# Patient Record
Sex: MALE | Race: WHITE | Employment: FULL TIME | URBAN - METROPOLITAN AREA
[De-identification: names, ages, dates, MRNs, and addresses within clinical notes are randomized per-mention and may not be internally consistent; named-entity substitution may affect disease eponyms.]

---

## 2019-08-14 ENCOUNTER — APPOINTMENT (OUTPATIENT)
Dept: GENERAL RADIOLOGY | Age: 49
End: 2019-08-14
Payer: COMMERCIAL

## 2019-08-14 ENCOUNTER — HOSPITAL ENCOUNTER (EMERGENCY)
Age: 49
Discharge: HOME | End: 2019-08-14
Attending: EMERGENCY MEDICINE
Payer: COMMERCIAL

## 2019-08-14 VITALS
BODY MASS INDEX: 19.6 KG/M2 | HEIGHT: 71 IN | HEART RATE: 81 BPM | WEIGHT: 140 LBS | TEMPERATURE: 98.1 F | OXYGEN SATURATION: 99 % | RESPIRATION RATE: 16 BRPM | SYSTOLIC BLOOD PRESSURE: 138 MMHG | DIASTOLIC BLOOD PRESSURE: 79 MMHG

## 2019-08-14 DIAGNOSIS — S60.221A CONTUSION OF RIGHT HAND, INITIAL ENCOUNTER: Primary | ICD-10-CM

## 2019-08-14 PROCEDURE — 99283 EMERGENCY DEPT VISIT LOW MDM: CPT

## 2019-08-14 PROCEDURE — 73140 X-RAY EXAM OF FINGER(S): CPT

## 2020-04-27 ENCOUNTER — APPOINTMENT (OUTPATIENT)
Dept: GENERAL RADIOLOGY | Age: 50
End: 2020-04-27
Payer: COMMERCIAL

## 2020-04-27 ENCOUNTER — HOSPITAL ENCOUNTER (EMERGENCY)
Age: 50
Discharge: HOME | End: 2020-04-27
Payer: COMMERCIAL

## 2020-04-27 VITALS
OXYGEN SATURATION: 100 % | RESPIRATION RATE: 18 BRPM | TEMPERATURE: 98.1 F | HEIGHT: 72 IN | HEART RATE: 102 BPM | SYSTOLIC BLOOD PRESSURE: 125 MMHG | WEIGHT: 140 LBS | DIASTOLIC BLOOD PRESSURE: 75 MMHG | BODY MASS INDEX: 18.96 KG/M2

## 2020-04-27 PROCEDURE — 6370000000 HC RX 637 (ALT 250 FOR IP): Performed by: EMERGENCY MEDICINE

## 2020-04-27 PROCEDURE — 99283 EMERGENCY DEPT VISIT LOW MDM: CPT

## 2020-04-27 PROCEDURE — 6360000002 HC RX W HCPCS: Performed by: EMERGENCY MEDICINE

## 2020-04-27 PROCEDURE — 90715 TDAP VACCINE 7 YRS/> IM: CPT | Performed by: EMERGENCY MEDICINE

## 2020-04-27 PROCEDURE — 73630 X-RAY EXAM OF FOOT: CPT

## 2020-04-27 PROCEDURE — 90471 IMMUNIZATION ADMIN: CPT | Performed by: EMERGENCY MEDICINE

## 2020-04-27 RX ORDER — CEPHALEXIN 500 MG/1
500 CAPSULE ORAL ONCE
Status: COMPLETED | OUTPATIENT
Start: 2020-04-27 | End: 2020-04-27

## 2020-04-27 RX ORDER — HYDROCODONE BITARTRATE AND ACETAMINOPHEN 5; 325 MG/1; MG/1
1 TABLET ORAL EVERY 6 HOURS PRN
Qty: 12 TABLET | Refills: 0 | Status: SHIPPED | OUTPATIENT
Start: 2020-04-27 | End: 2020-04-30

## 2020-04-27 RX ORDER — HYDROCODONE BITARTRATE AND ACETAMINOPHEN 5; 325 MG/1; MG/1
1 TABLET ORAL ONCE
Status: COMPLETED | OUTPATIENT
Start: 2020-04-27 | End: 2020-04-27

## 2020-04-27 RX ORDER — CEPHALEXIN 500 MG/1
500 CAPSULE ORAL 4 TIMES DAILY
Qty: 40 CAPSULE | Refills: 0 | Status: SHIPPED | OUTPATIENT
Start: 2020-04-27 | End: 2020-07-10

## 2020-04-27 RX ADMIN — TETANUS TOXOID, REDUCED DIPHTHERIA TOXOID AND ACELLULAR PERTUSSIS VACCINE, ADSORBED 0.5 ML: 5; 2.5; 8; 8; 2.5 SUSPENSION INTRAMUSCULAR at 10:48

## 2020-04-27 RX ADMIN — CEPHALEXIN 500 MG: 500 CAPSULE ORAL at 10:48

## 2020-04-27 RX ADMIN — HYDROCODONE BITARTRATE AND ACETAMINOPHEN 1 TABLET: 5; 325 TABLET ORAL at 10:48

## 2020-04-27 ASSESSMENT — PAIN DESCRIPTION - FREQUENCY: FREQUENCY: INTERMITTENT

## 2020-04-27 ASSESSMENT — ENCOUNTER SYMPTOMS: COUGH: 0

## 2020-04-27 ASSESSMENT — PAIN DESCRIPTION - PAIN TYPE: TYPE: ACUTE PAIN

## 2020-04-27 ASSESSMENT — PAIN SCALES - GENERAL
PAINLEVEL_OUTOF10: 10
PAINLEVEL_OUTOF10: 10

## 2020-04-27 ASSESSMENT — PAIN DESCRIPTION - ORIENTATION: ORIENTATION: RIGHT

## 2020-04-27 ASSESSMENT — PAIN DESCRIPTION - ONSET: ONSET: SUDDEN

## 2020-04-27 ASSESSMENT — PAIN DESCRIPTION - LOCATION: LOCATION: FOOT

## 2020-04-27 NOTE — ED PROVIDER NOTES
[facility] [facility] Name: Joseph Lantigua  MRN: [id number]  Marygfabimael 1970  Date of evaluation: 4/27/2020  Provider:  No att. providers found                  HISTORY OF PRESENT ILLNESS   (Location/Symptom, Timing/Onset, Context/Setting, Quality, Duration, Modifying Factors, Severity)  Note limiting factors. Patient presents emergency department with complaint of injury to his right foot. Patient states a glass top from a desk fell on his foot the glass top remained intact. But he has had significant pain in his foot since that time. Patient has a puncture wound to the foot. He is not sure of his tetanus status. He states he has had no fevers chills no cough no cold no nausea no vomiting no other problems. He is allergic to morphine and peanut butter but can eat other peanut products just not peanut butter. The history is provided by the patient. Nursing Notes were reviewed. REVIEW OF SYSTEMS    (2-9 systems for level 4, 10 or more for level 5)     Review of Systems   Constitutional: Negative for chills and fever. Respiratory: Negative for cough. Musculoskeletal: Positive for arthralgias and myalgias. Skin: Positive for wound. PAST MEDICAL HISTORY   has a past medical history of Asthma. PAST SURGICAL HISTORY   has a past surgical history that includes shoulder surgery. FAMILY HISTORY  family history is not on file. SOCIAL HISTORY   reports that he quit smoking about 6 years ago. His smoking use included cigarettes. He smoked 2.00 packs per day. He has never used smokeless tobacco. He reports current alcohol use. He reports that he does not use drugs. HOME MEDICATIONS     Prior to Admission medications    Not on File        ALLERGIES  is allergic to peanut butter flavor and morphine.             PHYSICAL EXAM    (up to 7 for level 4, 8 or more for level 5)         ED TRIAGE VITALS      /75   Pulse 102   Temp 98.1 °F (36.7 °C) (Oral)   Resp 18   Ht 5' 11.75\" (1.822 m)   Wt 140 lb (63.5 kg)   SpO2 100%   BMI 19.12 kg/m²         Physical Exam  Constitutional:       Appearance: He is well-developed. He is not ill-appearing or toxic-appearing. HENT:      Head: Normocephalic and atraumatic. Pulmonary:      Effort: Pulmonary effort is normal.   Musculoskeletal:        Feet:    Skin:     General: Skin is warm and dry. Neurological:      Mental Status: He is alert and oriented to person, place, and time. Motor: No abnormal muscle tone. Psychiatric:         Behavior: Behavior normal.     No tenderness to the ankle. Full range of motion of the knee and hip. DIAGNOSTIC RESULTS         RADIOLOGY:     XR FOOT RIGHT (MIN 3 VIEWS)   Final Result   Acute fractures of the 2nd and 3rd proximal phalanges. LABS:              EKG interpreted by         PROCEDURES:  Procedures        Emergency Department Course:      10:05 AM EDT  I spoke with Dr. Zara Amezcua. We thoroughly discussed the history, physical exam, laboratory and imaging studies, as well as, emergency department course. Based upon that discussion, we've decided to discharge Stephan Cogan home. We've agreed upon prompt follow in their office for a re-assessment. This time patient will be placed on antibiotics he will also be placed in a boot and crutches as he has difficulty walking. Dr. Zara Amezcua states that they will be happy to see him. Will refer him to Dr. Sowmya Bond but the patient can call the office and they will arrange follow-up that is closest and convenient for him they would like to see him within the next 2 days. 10:19 AM EDT: I spoke with the patient and discussed my conversation with Dr. Zara Amezcua. He is agreeable to the plan. We will thoroughly cleansed his wounds placed on Vicodin which she has had in the past without problems start him on an antibiotic and he will follow-up with Dr. James Perla.   Discussed results, diagnosis and

## 2020-04-29 ENCOUNTER — ANESTHESIA EVENT (OUTPATIENT)
Dept: OPERATING ROOM | Age: 50
End: 2020-04-29
Payer: COMMERCIAL

## 2020-04-29 ENCOUNTER — OFFICE VISIT (OUTPATIENT)
Dept: ORTHOPEDIC SURGERY | Age: 50
End: 2020-04-29
Payer: COMMERCIAL

## 2020-04-29 ENCOUNTER — HOSPITAL ENCOUNTER (OUTPATIENT)
Age: 50
Discharge: HOME | End: 2020-04-29
Payer: COMMERCIAL

## 2020-04-29 VITALS — BODY MASS INDEX: 18.96 KG/M2 | HEIGHT: 72 IN | WEIGHT: 139.99 LBS

## 2020-04-29 LAB
ANION GAP SERPL CALCULATED.3IONS-SCNC: 12 MMOL/L (ref 3–16)
BASOPHILS ABSOLUTE: 0.1 K/UL (ref 0–0.2)
BASOPHILS RELATIVE PERCENT: 0.8 %
BUN BLDV-MCNC: 9 MG/DL (ref 7–20)
CALCIUM SERPL-MCNC: 9.7 MG/DL (ref 8.3–10.6)
CHLORIDE BLD-SCNC: 100 MMOL/L (ref 99–110)
CO2: 26 MMOL/L (ref 21–32)
CREAT SERPL-MCNC: 0.7 MG/DL (ref 0.9–1.3)
EOSINOPHILS ABSOLUTE: 0.1 K/UL (ref 0–0.6)
EOSINOPHILS RELATIVE PERCENT: 1.7 %
GFR AFRICAN AMERICAN: >60
GFR NON-AFRICAN AMERICAN: >60
GLUCOSE BLD-MCNC: 118 MG/DL (ref 70–99)
HCT VFR BLD CALC: 44.9 % (ref 40.5–52.5)
HEMOGLOBIN: 15.1 G/DL (ref 13.5–17.5)
LYMPHOCYTES ABSOLUTE: 1.1 K/UL (ref 1–5.1)
LYMPHOCYTES RELATIVE PERCENT: 14.7 %
MCH RBC QN AUTO: 30.1 PG (ref 26–34)
MCHC RBC AUTO-ENTMCNC: 33.5 G/DL (ref 31–36)
MCV RBC AUTO: 89.9 FL (ref 80–100)
MONOCYTES ABSOLUTE: 0.4 K/UL (ref 0–1.3)
MONOCYTES RELATIVE PERCENT: 6 %
NEUTROPHILS ABSOLUTE: 5.8 K/UL (ref 1.7–7.7)
NEUTROPHILS RELATIVE PERCENT: 76.8 %
PDW BLD-RTO: 13.9 % (ref 12.4–15.4)
PLATELET # BLD: 224 K/UL (ref 135–450)
PMV BLD AUTO: 8.5 FL (ref 5–10.5)
POTASSIUM SERPL-SCNC: 4.1 MMOL/L (ref 3.5–5.1)
RBC # BLD: 4.99 M/UL (ref 4.2–5.9)
SODIUM BLD-SCNC: 138 MMOL/L (ref 136–145)
WBC # BLD: 7.5 K/UL (ref 4–11)

## 2020-04-29 PROCEDURE — 36415 COLL VENOUS BLD VENIPUNCTURE: CPT

## 2020-04-29 PROCEDURE — 85025 COMPLETE CBC W/AUTO DIFF WBC: CPT

## 2020-04-29 PROCEDURE — 80048 BASIC METABOLIC PNL TOTAL CA: CPT

## 2020-04-29 PROCEDURE — 99203 OFFICE O/P NEW LOW 30 MIN: CPT | Performed by: ORTHOPAEDIC SURGERY

## 2020-04-29 NOTE — PROGRESS NOTES
Chief Complaint    New Patient ([facility] Right Foot: DOI 4/27/2020 was picking up a heavy glass and slipped out of his hand and came down on his foot, most of the pain on the and front half of the foot, some tingling, icing increases pain, unable to WTB due to pain, seen in ER 4/27/2020 placed in boot closed FX of the 3 toe )      History of Present Illness:  Patricia Maya is a 52 y.o. malehere for evaluation chief complaint of right foot pain. He states on 4/27/2020 he was at work as a  and he was trying to put a glass table into his truck when it slipped and landed on his right foot. He had a work boot on at the time and it cut through the work boot. This was an isolated injury to his right foot. Denies any other injuries. He was seen in the emergency room and placed into a low tide boot. He was placed on antibiotics but did not get them filled. He is taking ibuprofen for his pain. He has had multiple orthopedic injuries in the past.  Rates his pain at 8 out of 10. Cold seems to make it worse elevation and ibuprofen make it better      Medical History:  Patient's medications, allergies, past medical, surgical, social and family histories were reviewed and updated as appropriate. Review of Systems:  Pertinent items are noted in HPI  Review of systems reviewed from Patient History Form dated on 4/29/2020 and available in the patient's chart under the Media tab. Vital Signs:  Ht 5' 11.73\" (1.822 m)   Wt 139 lb 15.9 oz (63.5 kg)   BMI 19.13 kg/m²     General Exam:   Constitutional: Patient is adequately groomed with no evidence of malnutrition  DTRs: Deep tendon reflexes are intact  Mental Status: The patient is oriented to time, place and person. The patient's mood and affect are appropriate. Lymphatic: The lymphatic examination bilaterally reveals all areas to be without enlargement or induration. Foot Examination:    Inspection: Moderate swelling right foot and ankle.   He has a small abrasion over the dorsal aspect of the right forefoot at the base of the second and third toes    Palpation: Tenderness throughout the right foot    Range of Motion: Limited at the toes due to complaint of pain    Strength: Flexor and extensor tendons are intact to the right forefoot    Special Tests: No evidence of DVT or compartment syndrome    Skin: There are no rashes, ulcerations or lesions. Gait: Antalgic    Reflex 2+ and symmetric    Additional Comments:       Additional Examinations:         Left Lower Extremity: Examination of the left lower extremity does not show any tenderness, deformity or injury. Range of motion is unremarkable. There is no gross instability. There are no rashes, ulcerations or lesions. Strength and tone are normal.    Radiology:     X-rays obtained and reviewed in office:  Views 3  Location right foot  Impression obtained previously shows evidence of a second toe P1 fracture that is in a valgus position of approximately 30 to 40 degrees. He has a comminuted P1 fracture of the third toe which is 100% displaced          Assessment : This patient has crush injury to his right foot with second and third toe P1 fractures that are displaced and the third is extremely comminuted    Impression:  No diagnosis found. Office Procedures:  No orders of the defined types were placed in this encounter. Treatment Plan:  The etiology of comminuted toe fracture and it's appropriate treatment were discussed in great detail. We discussed operative and nonoperative options. I would recommend closed versus possible open reduction and pinning of the second and third toes. We discussed postop rehab and estimated return to work. 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 He will not be able to do this until released 3 months out from his date of surgery which is 4/30/2020.   All questions were answered no guarantees were given

## 2020-04-30 ENCOUNTER — HOSPITAL ENCOUNTER (OUTPATIENT)
Age: 50
Setting detail: OUTPATIENT SURGERY
Discharge: HOME | End: 2020-04-30
Attending: ORTHOPAEDIC SURGERY | Admitting: ORTHOPAEDIC SURGERY
Payer: COMMERCIAL

## 2020-04-30 ENCOUNTER — ANESTHESIA (OUTPATIENT)
Dept: OPERATING ROOM | Age: 50
End: 2020-04-30
Payer: COMMERCIAL

## 2020-04-30 VITALS
SYSTOLIC BLOOD PRESSURE: 126 MMHG | HEIGHT: 71 IN | RESPIRATION RATE: 12 BRPM | BODY MASS INDEX: 19.6 KG/M2 | TEMPERATURE: 96.2 F | DIASTOLIC BLOOD PRESSURE: 94 MMHG | HEART RATE: 86 BPM | WEIGHT: 140 LBS | OXYGEN SATURATION: 98 %

## 2020-04-30 VITALS
OXYGEN SATURATION: 100 % | TEMPERATURE: 98.6 F | RESPIRATION RATE: 18 BRPM | SYSTOLIC BLOOD PRESSURE: 115 MMHG | DIASTOLIC BLOOD PRESSURE: 72 MMHG

## 2020-04-30 PROCEDURE — 7100000000 HC PACU RECOVERY - FIRST 15 MIN: Performed by: ORTHOPAEDIC SURGERY

## 2020-04-30 PROCEDURE — 2580000003 HC RX 258: Performed by: ORTHOPAEDIC SURGERY

## 2020-04-30 PROCEDURE — 2500000003 HC RX 250 WO HCPCS

## 2020-04-30 PROCEDURE — 2500000003 HC RX 250 WO HCPCS: Performed by: ORTHOPAEDIC SURGERY

## 2020-04-30 PROCEDURE — 3600000004 HC SURGERY LEVEL 4 BASE: Performed by: ORTHOPAEDIC SURGERY

## 2020-04-30 PROCEDURE — 6360000002 HC RX W HCPCS: Performed by: ANESTHESIOLOGY

## 2020-04-30 PROCEDURE — 7100000011 HC PHASE II RECOVERY - ADDTL 15 MIN: Performed by: ORTHOPAEDIC SURGERY

## 2020-04-30 PROCEDURE — 3600000014 HC SURGERY LEVEL 4 ADDTL 15MIN: Performed by: ORTHOPAEDIC SURGERY

## 2020-04-30 PROCEDURE — 6360000002 HC RX W HCPCS

## 2020-04-30 PROCEDURE — 2580000003 HC RX 258: Performed by: ANESTHESIOLOGY

## 2020-04-30 PROCEDURE — 3700000000 HC ANESTHESIA ATTENDED CARE: Performed by: ORTHOPAEDIC SURGERY

## 2020-04-30 PROCEDURE — 3700000001 HC ADD 15 MINUTES (ANESTHESIA): Performed by: ORTHOPAEDIC SURGERY

## 2020-04-30 PROCEDURE — 2709999900 HC NON-CHARGEABLE SUPPLY: Performed by: ORTHOPAEDIC SURGERY

## 2020-04-30 PROCEDURE — 2500000003 HC RX 250 WO HCPCS: Performed by: ANESTHESIOLOGY

## 2020-04-30 PROCEDURE — 7100000001 HC PACU RECOVERY - ADDTL 15 MIN: Performed by: ORTHOPAEDIC SURGERY

## 2020-04-30 PROCEDURE — 6370000000 HC RX 637 (ALT 250 FOR IP): Performed by: ANESTHESIOLOGY

## 2020-04-30 PROCEDURE — C1713 ANCHOR/SCREW BN/BN,TIS/BN: HCPCS | Performed by: ORTHOPAEDIC SURGERY

## 2020-04-30 PROCEDURE — 7100000010 HC PHASE II RECOVERY - FIRST 15 MIN: Performed by: ORTHOPAEDIC SURGERY

## 2020-04-30 DEVICE — K WIRE FIX L152MM DIA1.6MM S STL 2 TRCR PNT: Type: IMPLANTABLE DEVICE | Site: TOES | Status: FUNCTIONAL

## 2020-04-30 RX ORDER — PROMETHAZINE HYDROCHLORIDE 25 MG/ML
6.25 INJECTION, SOLUTION INTRAMUSCULAR; INTRAVENOUS
Status: DISCONTINUED | OUTPATIENT
Start: 2020-04-30 | End: 2020-04-30 | Stop reason: HOSPADM

## 2020-04-30 RX ORDER — FENTANYL CITRATE 50 UG/ML
INJECTION, SOLUTION INTRAMUSCULAR; INTRAVENOUS PRN
Status: DISCONTINUED | OUTPATIENT
Start: 2020-04-30 | End: 2020-04-30 | Stop reason: SDUPTHER

## 2020-04-30 RX ORDER — OXYCODONE HYDROCHLORIDE AND ACETAMINOPHEN 5; 325 MG/1; MG/1
1 TABLET ORAL PRN
Status: DISCONTINUED | OUTPATIENT
Start: 2020-04-30 | End: 2020-04-30 | Stop reason: HOSPADM

## 2020-04-30 RX ORDER — SODIUM CHLORIDE 0.9 % (FLUSH) 0.9 %
10 SYRINGE (ML) INJECTION PRN
Status: DISCONTINUED | OUTPATIENT
Start: 2020-04-30 | End: 2020-04-30 | Stop reason: HOSPADM

## 2020-04-30 RX ORDER — LABETALOL HYDROCHLORIDE 5 MG/ML
5 INJECTION, SOLUTION INTRAVENOUS EVERY 10 MIN PRN
Status: DISCONTINUED | OUTPATIENT
Start: 2020-04-30 | End: 2020-04-30 | Stop reason: HOSPADM

## 2020-04-30 RX ORDER — ACETAMINOPHEN 500 MG
1000 TABLET ORAL ONCE
Status: COMPLETED | OUTPATIENT
Start: 2020-04-30 | End: 2020-04-30

## 2020-04-30 RX ORDER — SCOLOPAMINE TRANSDERMAL SYSTEM 1 MG/1
1 PATCH, EXTENDED RELEASE TRANSDERMAL
Status: DISCONTINUED | OUTPATIENT
Start: 2020-04-30 | End: 2020-04-30 | Stop reason: HOSPADM

## 2020-04-30 RX ORDER — SODIUM CHLORIDE 0.9 % (FLUSH) 0.9 %
10 SYRINGE (ML) INJECTION EVERY 12 HOURS SCHEDULED
Status: DISCONTINUED | OUTPATIENT
Start: 2020-04-30 | End: 2020-04-30 | Stop reason: HOSPADM

## 2020-04-30 RX ORDER — SCOLOPAMINE TRANSDERMAL SYSTEM 1 MG/1
PATCH, EXTENDED RELEASE TRANSDERMAL
Status: DISCONTINUED
Start: 2020-04-30 | End: 2020-04-30 | Stop reason: HOSPADM

## 2020-04-30 RX ORDER — PROPOFOL 10 MG/ML
INJECTION, EMULSION INTRAVENOUS PRN
Status: DISCONTINUED | OUTPATIENT
Start: 2020-04-30 | End: 2020-04-30 | Stop reason: SDUPTHER

## 2020-04-30 RX ORDER — ONDANSETRON 2 MG/ML
INJECTION INTRAMUSCULAR; INTRAVENOUS PRN
Status: DISCONTINUED | OUTPATIENT
Start: 2020-04-30 | End: 2020-04-30 | Stop reason: SDUPTHER

## 2020-04-30 RX ORDER — SODIUM CHLORIDE, SODIUM LACTATE, POTASSIUM CHLORIDE, CALCIUM CHLORIDE 600; 310; 30; 20 MG/100ML; MG/100ML; MG/100ML; MG/100ML
INJECTION, SOLUTION INTRAVENOUS CONTINUOUS
Status: DISCONTINUED | OUTPATIENT
Start: 2020-04-30 | End: 2020-04-30 | Stop reason: HOSPADM

## 2020-04-30 RX ORDER — HYDRALAZINE HYDROCHLORIDE 20 MG/ML
5 INJECTION INTRAMUSCULAR; INTRAVENOUS EVERY 10 MIN PRN
Status: DISCONTINUED | OUTPATIENT
Start: 2020-04-30 | End: 2020-04-30 | Stop reason: HOSPADM

## 2020-04-30 RX ORDER — MORPHINE SULFATE 2 MG/ML
2 INJECTION, SOLUTION INTRAMUSCULAR; INTRAVENOUS EVERY 5 MIN PRN
Status: DISCONTINUED | OUTPATIENT
Start: 2020-04-30 | End: 2020-04-30 | Stop reason: HOSPADM

## 2020-04-30 RX ORDER — BUPIVACAINE HYDROCHLORIDE 5 MG/ML
INJECTION, SOLUTION EPIDURAL; INTRACAUDAL PRN
Status: DISCONTINUED | OUTPATIENT
Start: 2020-04-30 | End: 2020-04-30 | Stop reason: ALTCHOICE

## 2020-04-30 RX ORDER — MAGNESIUM HYDROXIDE 1200 MG/15ML
LIQUID ORAL CONTINUOUS PRN
Status: COMPLETED | OUTPATIENT
Start: 2020-04-30 | End: 2020-04-30

## 2020-04-30 RX ORDER — ONDANSETRON 2 MG/ML
4 INJECTION INTRAMUSCULAR; INTRAVENOUS PRN
Status: DISCONTINUED | OUTPATIENT
Start: 2020-04-30 | End: 2020-04-30 | Stop reason: HOSPADM

## 2020-04-30 RX ORDER — CEFAZOLIN SODIUM 1 G/3ML
INJECTION, POWDER, FOR SOLUTION INTRAMUSCULAR; INTRAVENOUS PRN
Status: DISCONTINUED | OUTPATIENT
Start: 2020-04-30 | End: 2020-04-30 | Stop reason: SDUPTHER

## 2020-04-30 RX ORDER — MORPHINE SULFATE 2 MG/ML
1 INJECTION, SOLUTION INTRAMUSCULAR; INTRAVENOUS EVERY 5 MIN PRN
Status: DISCONTINUED | OUTPATIENT
Start: 2020-04-30 | End: 2020-04-30 | Stop reason: HOSPADM

## 2020-04-30 RX ORDER — OXYCODONE HYDROCHLORIDE AND ACETAMINOPHEN 5; 325 MG/1; MG/1
2 TABLET ORAL PRN
Status: DISCONTINUED | OUTPATIENT
Start: 2020-04-30 | End: 2020-04-30 | Stop reason: HOSPADM

## 2020-04-30 RX ORDER — DIPHENHYDRAMINE HYDROCHLORIDE 50 MG/ML
12.5 INJECTION INTRAMUSCULAR; INTRAVENOUS
Status: DISCONTINUED | OUTPATIENT
Start: 2020-04-30 | End: 2020-04-30 | Stop reason: HOSPADM

## 2020-04-30 RX ORDER — LIDOCAINE HYDROCHLORIDE 20 MG/ML
INJECTION, SOLUTION INFILTRATION; PERINEURAL PRN
Status: DISCONTINUED | OUTPATIENT
Start: 2020-04-30 | End: 2020-04-30 | Stop reason: SDUPTHER

## 2020-04-30 RX ADMIN — ONDANSETRON 4 MG: 2 INJECTION INTRAMUSCULAR; INTRAVENOUS at 14:25

## 2020-04-30 RX ADMIN — SODIUM CHLORIDE, POTASSIUM CHLORIDE, SODIUM LACTATE AND CALCIUM CHLORIDE: 600; 310; 30; 20 INJECTION, SOLUTION INTRAVENOUS at 12:12

## 2020-04-30 RX ADMIN — ONDANSETRON 4 MG: 2 INJECTION INTRAMUSCULAR; INTRAVENOUS at 16:41

## 2020-04-30 RX ADMIN — FENTANYL CITRATE 25 MCG: 50 INJECTION INTRAMUSCULAR; INTRAVENOUS at 14:48

## 2020-04-30 RX ADMIN — ACETAMINOPHEN 1000 MG: 500 TABLET ORAL at 16:22

## 2020-04-30 RX ADMIN — FENTANYL CITRATE 25 MCG: 50 INJECTION INTRAMUSCULAR; INTRAVENOUS at 14:30

## 2020-04-30 RX ADMIN — SODIUM CHLORIDE, POTASSIUM CHLORIDE, SODIUM LACTATE AND CALCIUM CHLORIDE: 600; 310; 30; 20 INJECTION, SOLUTION INTRAVENOUS at 14:03

## 2020-04-30 RX ADMIN — FENTANYL CITRATE 25 MCG: 50 INJECTION INTRAMUSCULAR; INTRAVENOUS at 14:55

## 2020-04-30 RX ADMIN — LIDOCAINE HYDROCHLORIDE 60 MG: 20 INJECTION, SOLUTION INFILTRATION; PERINEURAL at 14:25

## 2020-04-30 RX ADMIN — CEFAZOLIN 2000 MG: 1 INJECTION, POWDER, FOR SOLUTION INTRAMUSCULAR; INTRAVENOUS at 14:25

## 2020-04-30 RX ADMIN — FENTANYL CITRATE 25 MCG: 50 INJECTION INTRAMUSCULAR; INTRAVENOUS at 15:16

## 2020-04-30 RX ADMIN — PROPOFOL 200 MG: 10 INJECTION, EMULSION INTRAVENOUS at 14:25

## 2020-04-30 RX ADMIN — FAMOTIDINE 20 MG: 10 INJECTION INTRAVENOUS at 12:12

## 2020-04-30 ASSESSMENT — PULMONARY FUNCTION TESTS
PIF_VALUE: 15
PIF_VALUE: 2
PIF_VALUE: 3
PIF_VALUE: 13
PIF_VALUE: 2
PIF_VALUE: 2
PIF_VALUE: 15
PIF_VALUE: 3
PIF_VALUE: 2
PIF_VALUE: 15
PIF_VALUE: 2
PIF_VALUE: 15
PIF_VALUE: 15
PIF_VALUE: 2
PIF_VALUE: 15
PIF_VALUE: 0
PIF_VALUE: 2
PIF_VALUE: 15
PIF_VALUE: 0
PIF_VALUE: 2
PIF_VALUE: 22
PIF_VALUE: 22
PIF_VALUE: 14
PIF_VALUE: 4
PIF_VALUE: 2
PIF_VALUE: 14
PIF_VALUE: 4
PIF_VALUE: 14
PIF_VALUE: 15
PIF_VALUE: 2
PIF_VALUE: 15
PIF_VALUE: 11
PIF_VALUE: 15
PIF_VALUE: 2
PIF_VALUE: 15
PIF_VALUE: 14
PIF_VALUE: 0
PIF_VALUE: 2
PIF_VALUE: 14
PIF_VALUE: 0
PIF_VALUE: 15
PIF_VALUE: 2
PIF_VALUE: 6
PIF_VALUE: 14
PIF_VALUE: 2
PIF_VALUE: 15
PIF_VALUE: 2
PIF_VALUE: 2
PIF_VALUE: 1
PIF_VALUE: 15
PIF_VALUE: 14
PIF_VALUE: 14
PIF_VALUE: 2
PIF_VALUE: 2
PIF_VALUE: 1
PIF_VALUE: 2
PIF_VALUE: 7
PIF_VALUE: 2
PIF_VALUE: 0
PIF_VALUE: 2
PIF_VALUE: 2
PIF_VALUE: 3
PIF_VALUE: 10

## 2020-04-30 ASSESSMENT — PAIN DESCRIPTION - DESCRIPTORS
DESCRIPTORS: ACHING
DESCRIPTORS: HEADACHE

## 2020-04-30 ASSESSMENT — PAIN DESCRIPTION - LOCATION
LOCATION: HEAD
LOCATION: HEAD;FOOT

## 2020-04-30 ASSESSMENT — PAIN SCALES - GENERAL
PAINLEVEL_OUTOF10: 5
PAINLEVEL_OUTOF10: 5

## 2020-04-30 ASSESSMENT — PAIN DESCRIPTION - ORIENTATION: ORIENTATION: RIGHT

## 2020-04-30 ASSESSMENT — PAIN - FUNCTIONAL ASSESSMENT: PAIN_FUNCTIONAL_ASSESSMENT: 0-10

## 2020-04-30 NOTE — ANESTHESIA POSTPROCEDURE EVALUATION
Department of Anesthesiology  Postprocedure Note    Patient: Miguel Franco  MRN: [id number]  YOB: 1970  Date of evaluation: 4/30/2020  Time:  4:09 PM     Procedure Summary     Date:  04/30/20 Room / Location:  [address] [place] [address]    Anesthesia Start:  1052 Anesthesia Stop:  8444    Procedure:  OPEN REDUCTION AND PINNING RIGHT 2ND AND 3RD TOE FRACTURES (Right Toes) Diagnosis:  (FRACTURE RIGHT 2ND AND 3RD TOES  T91.0952)    Surgeon:  María James MD Responsible Provider:  Jess Treviño MD    Anesthesia Type:  general ASA Status:  2          Anesthesia Type: general    Figueroa Phase I: Figueroa Score: 10    Figueroa Phase II:      Last vitals: Reviewed and per EMR flowsheets.        Anesthesia Post Evaluation    Patient location during evaluation: PACU  Patient participation: complete - patient participated  Level of consciousness: awake and alert  Pain score: 0  Airway patency: patent  Nausea & Vomiting: no nausea and no vomiting  Complications: no  Cardiovascular status: blood pressure returned to baseline  Respiratory status: acceptable  Hydration status: stable

## 2020-05-01 NOTE — OP NOTE
the second toe. Reduction was performed with  distraction and manual manipulation and then the pin advanced across the  MTP joint into the metatarsal.  Adequate position was noted. Our  attention was then turned to the third toe which was extremely  comminuted and displaced. Multiple attempts were made at percutaneous  pinning, but we could not keep the fracture reduced and so a 2 cm  longitudinal incision was made over the MTP joint and proximal phalanx. It was carried down through the subcutaneous tissue using tenotomy  scissors. Care was taken to leave as much of the soft tissue completely  intact as possible and two Hohmanns were used to reduce the fracture  into a good position while a second 0.062 K-wire was placed down the tip  of the toe retrograde across the comminuted segment of the proximal  phalanx fracture and then into the third metatarsal.  The position was  verified under image intensification and then the wound copiously  irrigated with normal saline, closed using 3-0 Vicryl suture in inverted  fashion for the subcutaneous tissue and 4-0 nylon in simple fashion for  the skin. The area was covered with Xeroform dry sterile dressing,  sterile Webril. Pins were bent and capped and Ace bandage was applied  and the patient was placed into a boot that he brought with him. Tourniquet was deflated after less than 45 minutes. Toes were noted to  pink up nicely. The patient was awake in the operating room, brought to  the PACU in good condition. ADDENDUM:  Three views of the foot were obtained multiple times  throughout the procedure. Final images were obtained, read by me and  showed that the second and third toe proximal phalanx fractures had been  reduced, held with two 0.062 K-wires.         Magno Kaur MD    D: 04/30/2020 15:45:22       T: 04/30/2020 15:53:53     [email or web address]  Job#: [id number]     Doc#: [id number]    CC:

## 2020-05-15 ENCOUNTER — OFFICE VISIT (OUTPATIENT)
Dept: ORTHOPEDIC SURGERY | Age: 50
End: 2020-05-15
Payer: COMMERCIAL

## 2020-05-15 VITALS — BODY MASS INDEX: 19.6 KG/M2 | HEIGHT: 71 IN | WEIGHT: 139.99 LBS

## 2020-05-15 PROCEDURE — 99024 POSTOP FOLLOW-UP VISIT: CPT | Performed by: ORTHOPAEDIC SURGERY

## 2020-05-18 ENCOUNTER — TELEPHONE (OUTPATIENT)
Dept: ORTHOPEDIC SURGERY | Age: 50
End: 2020-05-18

## 2020-05-29 ENCOUNTER — OFFICE VISIT (OUTPATIENT)
Dept: ORTHOPEDIC SURGERY | Age: 50
End: 2020-05-29

## 2020-05-29 VITALS — HEIGHT: 71 IN | BODY MASS INDEX: 19.6 KG/M2 | WEIGHT: 139.99 LBS

## 2020-05-29 PROCEDURE — 99024 POSTOP FOLLOW-UP VISIT: CPT | Performed by: ORTHOPAEDIC SURGERY

## 2020-06-12 ENCOUNTER — OFFICE VISIT (OUTPATIENT)
Dept: ORTHOPEDIC SURGERY | Age: 50
End: 2020-06-12

## 2020-06-12 VITALS — WEIGHT: 139 LBS | HEIGHT: 70 IN | BODY MASS INDEX: 19.9 KG/M2

## 2020-06-12 PROCEDURE — 99024 POSTOP FOLLOW-UP VISIT: CPT | Performed by: ORTHOPAEDIC SURGERY

## 2020-06-12 NOTE — PROGRESS NOTES
Subjective: Patient is here for follow-up of his 4/30/2020 right second and third toe P1 fractures open reduction percutaneous pinning. 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 His alignment looks excellent. No tenderness to palpation. No evidence of DVT 20 degrees of dorsiflexion 50 degrees of plantarflexion  Imaging: 3 views of the right foot show the toe fractures continue to heal and with no change in position  Assessment and plan: I pulled his pins without difficulty under sterile technique and covered his toes with Band-Aids. I instructed him in how to gradually increase his activity and raji tape.   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

## 2020-06-18 ENCOUNTER — TELEPHONE (OUTPATIENT)
Dept: ORTHOPEDIC SURGERY | Age: 50
End: 2020-06-18

## 2020-07-10 ENCOUNTER — OFFICE VISIT (OUTPATIENT)
Dept: ORTHOPEDIC SURGERY | Age: 50
End: 2020-07-10

## 2020-07-10 VITALS — BODY MASS INDEX: 19.9 KG/M2 | HEIGHT: 70 IN | WEIGHT: 139 LBS

## 2020-07-10 PROCEDURE — 99024 POSTOP FOLLOW-UP VISIT: CPT | Performed by: ORTHOPAEDIC SURGERY

## 2020-07-10 RX ORDER — ACETAMINOPHEN 500 MG
500 TABLET ORAL EVERY 6 HOURS PRN
COMMUNITY

## 2020-07-10 NOTE — PROGRESS NOTES
Subjective: Patient is here for follow-up of his 4/30/2020 right foot second and third toe P1 comminuted fractures closed reduction and pinning. He states he is doing fairly more he is on his feet the more it hurts him. He went back to work as of 6/15/2020 and his date of injury was 4/27/2020. He has been working as a . He is only driving right now  Objective: Physical exam shows toe show mild to moderate swelling. His alignment looks good there is no deformity. He has decreased range of motion of the MTP PIP and DIP joints of the second and third toes. No evidence of DVT sensations intact 20 degrees of ankle dorsiflexion 40 degrees of plantarflexion  Imaging: 3 views of the right foot show the fracture with good new callus formation no change in position joints well reduced  Assessment and plan: Patient is doing well. I filled out his workman's comp form from [facility] so that he could do limited time on his feet.   He will follow-up with me in 6 weeks repeat x-rays hopefully I can let him go back to work without restriction

## 2020-08-21 ENCOUNTER — OFFICE VISIT (OUTPATIENT)
Dept: ORTHOPEDIC SURGERY | Age: 50
End: 2020-08-21
Payer: COMMERCIAL

## 2020-08-21 VITALS — BODY MASS INDEX: 19.88 KG/M2 | WEIGHT: 138.89 LBS | HEIGHT: 70 IN

## 2020-08-21 PROCEDURE — 99212 OFFICE O/P EST SF 10 MIN: CPT | Performed by: ORTHOPAEDIC SURGERY

## 2020-08-21 NOTE — PROGRESS NOTES
Subjective: Patient is here for follow-up of his 4/30/2020 right foot second and third toe P1 comminuted fractures closed reduction and pinning. This is a Workmen's Comp. injury and he states he is doing well has no pain he is back to work without restriction  Objective: Physical exam shows he has 0 degrees of PIP flexion in both the second and third toes. Has 45 degrees of MTP extension second and third toe and 30 degrees of flexion. Alignment looks good. No evidence of DVT strength is 4-5 over 5 throughout  Imaging: 3 views of the right foot show the fractures unchanged in position appear to be healed  Assessment and plan: Overall this patient is doing well. I made him MMI as of today and he is already working without restriction.   He agreed with this plan

## 2024-02-23 ENCOUNTER — HOSPITAL ENCOUNTER (OUTPATIENT)
Age: 54
Discharge: HOME | End: 2024-02-23
Payer: COMMERCIAL

## 2024-02-23 ENCOUNTER — HOSPITAL ENCOUNTER (OUTPATIENT)
Dept: GENERAL RADIOLOGY | Age: 54
Discharge: HOME | End: 2024-02-23
Payer: COMMERCIAL

## 2024-02-23 DIAGNOSIS — R52 PAIN: ICD-10-CM

## 2024-02-23 PROCEDURE — 72040 X-RAY EXAM NECK SPINE 2-3 VW: CPT

## 2024-02-28 ENCOUNTER — OFFICE VISIT (OUTPATIENT)
Dept: ORTHOPEDIC SURGERY | Age: 54
End: 2024-02-28
Payer: COMMERCIAL

## 2024-02-28 VITALS — HEIGHT: 70 IN | WEIGHT: 138.89 LBS | BODY MASS INDEX: 19.88 KG/M2

## 2024-02-28 DIAGNOSIS — M47.812 CERVICAL SPONDYLOSIS: Primary | ICD-10-CM

## 2024-02-28 PROCEDURE — 99204 OFFICE O/P NEW MOD 45 MIN: CPT | Performed by: ORTHOPAEDIC SURGERY

## 2024-02-28 RX ORDER — METHOCARBAMOL 750 MG/1
750 TABLET, FILM COATED ORAL 3 TIMES DAILY
Qty: 30 TABLET | Refills: 1 | Status: SHIPPED | OUTPATIENT
Start: 2024-02-28 | End: 2024-03-19

## 2024-02-28 NOTE — PROGRESS NOTES
vomiting, diarrhea   : Denies bowel or bladder issues       PHYSICAL EXAM:    Vitals: Height 1.778 m (5' 10\"), weight 63 kg (138 lb 14.2 oz).    GENERAL EXAM:  General Apparence: Patient is adequately groomed with no evidence of malnutrition.  Orientation: The patient is oriented to time, place and person.   Mood & Affect:The patient's mood and affect are appropriate   Vascular: Examination reveals no swelling tenderness in upper or lower extremities. Good capillary refill  Lymphatic: The lymphatic examination bilaterally reveals all areas to be without enlargement or induration  Sensation: Sensation is intact without deficit  Coordination/Balance: Good coordination     CERVICAL EXAMINATION:  Inspection: Local inspection shows no step-off or bruising.  Cervical alignment is normal.     Palpation: No evidence of tenderness at the midline, and trapezius.  Paraspinal tenderness is present. There is no step-off or paraspinal spasm.   Range of Motion: Cervical flexion, extension, and rotation are mildly reduced with pain.  Strength: 5/5 bilateral upper extremities   Special Tests:     Romano's negative bilaterally.       Cubital and Carpal tunnel Tinel's negative bilaterally.      Skin:There are no rashes, ulcerations or lesions in right & left upper extremities.  Reflexes: Bilaterally triceps, biceps and brachioradialis are 2+.  Clonus absent bilaterally at the feet.   Gait & station: normal, patient ambulates without assistance     Additional Examinations:       RIGHT UPPER EXTREMITY:  Inspection/examination of the right upper extremity does not show any tenderness, deformity or injury. Range of motion is unremarkable. There is no gross instability.  There are no rashes, ulcerations or lesions. Strength and tone are normal.  LEFT UPPER EXTREMITY: Inspection/examination of the left upper extremity does not show any tenderness, deformity or injury. Range of motion is unremarkable. There is no gross instability.  There

## 2024-03-04 ENCOUNTER — HOSPITAL ENCOUNTER (OUTPATIENT)
Dept: PHYSICAL THERAPY | Age: 54
Setting detail: THERAPIES SERIES
End: 2024-03-04
Payer: COMMERCIAL

## 2024-03-11 ENCOUNTER — HOSPITAL ENCOUNTER (OUTPATIENT)
Dept: PHYSICAL THERAPY | Age: 54
Setting detail: THERAPIES SERIES
Discharge: HOME | End: 2024-03-11
Payer: COMMERCIAL

## 2024-03-11 DIAGNOSIS — M43.6 NECK STIFFNESS: ICD-10-CM

## 2024-03-11 DIAGNOSIS — M54.2 NECK PAIN: Primary | ICD-10-CM

## 2024-03-11 PROCEDURE — 97112 NEUROMUSCULAR REEDUCATION: CPT

## 2024-03-11 PROCEDURE — 97161 PT EVAL LOW COMPLEX 20 MIN: CPT

## 2024-03-11 PROCEDURE — 97140 MANUAL THERAPY 1/> REGIONS: CPT

## 2024-03-11 PROCEDURE — 97110 THERAPEUTIC EXERCISES: CPT

## 2024-03-11 NOTE — PLAN OF CARE
improving soft tissue extensibility and allowing for proper ROM for normal function with self care, mobility, lifting and ambulation      GOALS     Patient stated goal: To be able to return to PLOF.   Status:  [] Progressing: [] Met: [] Not Met: [] Adjusted    Therapist goals for Patient:   Short Term Goals: To be achieved in: 2 weeks  Independent in HEP and progression per patient tolerance, in order to progress toward full function and prevent re-injury.    Status: [] Progressing: [] Met: [] Not Met: [] Adjusted  Patient will have a decrease in pain to 2/10 to help facilitate improvement in movement, function, and ADLs as indicated by functional deficits.   Status: [] Progressing: [] Met: [] Not Met: [] Adjusted    Long Term Goals: To be achieved in:  12  weeks  Disability index score of 10% or less for the Neck Disability Index to assist with return top prior level of function.   Status: [] Progressing: [] Met: [] Not Met: [] Adjusted  Improve shoulder AROM to WFL to allow for proper joint functioning as indicated by patients functional deficits.  Status: [] Progressing: [] Met: [] Not Met: [] Adjusted  Pt to improve strength to 4+/5 or better of deep cervical flexors, posterior chain UE/postural muscles, rotator cuff, scapular retractors, shoulder elevators, biceps, and triceps to allow for proper muscle and joint use in functional mobility, ADLs and prior level of function   Status: [] Progressing: [] Met: [] Not Met: [] Adjusted  Patient will return to  Throwing without increased symptoms or restriction to work towards return to prior level of function.        Status: [] Progressing: [] Met: [] Not Met: [] Adjusted  Patient will be able to manage symptoms while resuming full duty at work.             Status: [] Progressing: [] Met: [] Not Met: [] Adjusted    TREATMENT PLAN     Frequency/Duration: 1-2x/week for  12  weeks for the following treatment interventions:    Interventions:  [x] Therapeutic exercise

## 2024-03-13 ENCOUNTER — HOSPITAL ENCOUNTER (OUTPATIENT)
Dept: PHYSICAL THERAPY | Age: 54
Setting detail: THERAPIES SERIES
Discharge: HOME | End: 2024-03-13
Payer: COMMERCIAL

## 2024-03-13 PROCEDURE — 97112 NEUROMUSCULAR REEDUCATION: CPT | Performed by: PHYSICAL THERAPY ASSISTANT

## 2024-03-13 PROCEDURE — 97140 MANUAL THERAPY 1/> REGIONS: CPT | Performed by: PHYSICAL THERAPY ASSISTANT

## 2024-03-13 PROCEDURE — 97110 THERAPEUTIC EXERCISES: CPT | Performed by: PHYSICAL THERAPY ASSISTANT

## 2024-03-13 NOTE — PLAN OF CARE
[facility]- Outpatient Rehabilitation and Therapy [address] [place] [place] [address] office: [phone] fax: [phone]         Physical Therapy: TREATMENT/PROGRESS NOTE   Patient: Ramses Castro (54 y.o. male)   Examination Date: 2024   :  1970 MRN: [id number]   Visit #: 2   Insurance Allowable Auth Needed   Med Nec []Yes    []No    Insurance: Payor: AETNA / Plan: AETNA / Product Type: *No Product type* /   Insurance ID: [id number] - (Commercial)  Secondary Insurance (if applicable):    Treatment Diagnosis:     ICD-10-CM    1. Neck pain  M54.2       2. Neck stiffness  M43.6          Medical Diagnosis:  Cervical spondylosis [M47.812]   Referring Physician: Tito Quezada MD  PCP: Gypsy Aviles PA-C       Plan of care signed (Y/N):     Date of Patient follow up with Physician:      Progress Report/POC: NO  POC update due: (10 visits /OR AUTH LIMITS, whichever is less)  4/10/2024                                             Precautions/ Contra-indications:           Latex allergy:  NO  Pacemaker:    NO  Contraindications for Manipulation: None  Date of Surgery: n/a  Other:    Red Flags:  None    C-SSRS Triggered by Intake questionnaire:   [x] No, Questionnaire did not trigger screening.   [] Yes, Patient intake triggered further evaluation      [] C-SSRS Screening completed  [] PCP notified via Plan of Care  [] Emergency services notified     Preferred Language for Healthcare:   [x] English       [] other:    SUBJECTIVE EXAMINATION     Patient stated complaint: Pain was increased into the next day after last visit.  Sore in the neck today, shooting pain in the arms have decreased but has also limited his lifting.  HEP is hit and miss as far as making him more sore.  Shoulders feel a little more loose.         Test used Initial score  3/11/24 2024   Pain Summary VAS 4/10    Functional questionnaire Neck Disability Index 8/50  16%    Other:

## 2024-03-20 ENCOUNTER — HOSPITAL ENCOUNTER (OUTPATIENT)
Dept: PHYSICAL THERAPY | Age: 54
Setting detail: THERAPIES SERIES
Discharge: HOME | End: 2024-03-20
Payer: COMMERCIAL

## 2024-03-20 PROCEDURE — 97110 THERAPEUTIC EXERCISES: CPT

## 2024-03-20 PROCEDURE — 97140 MANUAL THERAPY 1/> REGIONS: CPT

## 2024-03-20 PROCEDURE — 97112 NEUROMUSCULAR REEDUCATION: CPT

## 2024-03-20 NOTE — FLOWSHEET NOTE
Note: Portions of this note have been templated and/or copied from initial evaluation, reassessments and prior notes for documentation efficiency.

## 2024-03-25 ENCOUNTER — HOSPITAL ENCOUNTER (OUTPATIENT)
Dept: PHYSICAL THERAPY | Age: 54
Setting detail: THERAPIES SERIES
Discharge: HOME | End: 2024-03-25
Payer: COMMERCIAL

## 2024-03-25 PROCEDURE — 97112 NEUROMUSCULAR REEDUCATION: CPT

## 2024-03-25 PROCEDURE — 97110 THERAPEUTIC EXERCISES: CPT

## 2024-03-25 PROCEDURE — 97140 MANUAL THERAPY 1/> REGIONS: CPT

## 2024-03-25 NOTE — FLOWSHEET NOTE
Exercises/Interventions     Therapeutic Ex (31314)  resistance Sets/time Reps Notes/Cues/Progressions   Pulley  5'     Supine Chin Tuck  1  5\" 20 x            Supine Cervical ROM  5\" 10 x ea  R,L    Supine open book   10\" 10x ea    Supine posterior capsule stretch  30\"  3x ea           Seated Upper Trap Stretch  1  30\" 3 x     Seated Levator Scap Stretch  1  30\" 3 x            Standing Radial Nerve Glides  2  3-5\"     Wrist flexion stretch   5\" 20x     strength Red  10\" 10x    Doorway ER stretch   10x10\"     Rows/ext green 1 20x VC for scapular retraction   B ER with chin tuck  1 20x           Manual Intervention (11881)  TIME     DTM to B Upper Trap  5'     Cervical Distaction  5'     PROM Cervical   5'     STM Extensor muscle group   3'            NMR re-education (27428) resistance Sets/time Reps CUES NEEDED   Seated Scap Squeeze  1  5\" 20 x  Tactile and Verbal Cues Required for activation of scapular stabilizers and proper technique   Seated Shrug with Scap Squeeze  1  5\" 20 x  Tactile and Verbal Cues Required for activation of scapular stabilizers and proper technique          Supine Scap Squeezes  1  5\" 20 x  Tactile and Verbal Cues Required for activation of scapular stabilizers and proper technique                                      Therapeutic Activity (53945)  Sets/time                                          Modalities:    No modalities applied this session    Education/Home Exercise Program: Patient HEP program created electronically.  Refer to [employer] access code: Access Code: [email or web address]  Access Code: [email or web address]  URL: [email or web address]  Date: 03/11/2024  Prepared by: Alexandria Gustafson    Exercises  - Supine Chin Tuck  - 1 x daily - 7 x weekly - 1 sets - 10 reps - 5\" hold  - Supine Cervical Rotation AROM on Pillow  - 1 x daily - 7 x weekly - 1 sets - 15 reps  - Supine Head Nod with Deep Neck Flexor Activation  - 1-3 x daily - 7 x weekly - 1 sets - 1 reps - 3-5' hold  - Supine

## 2024-03-27 ENCOUNTER — HOSPITAL ENCOUNTER (OUTPATIENT)
Dept: PHYSICAL THERAPY | Age: 54
Setting detail: THERAPIES SERIES
Discharge: HOME | End: 2024-03-27
Payer: COMMERCIAL

## 2024-03-27 PROCEDURE — 97140 MANUAL THERAPY 1/> REGIONS: CPT

## 2024-03-27 PROCEDURE — 97110 THERAPEUTIC EXERCISES: CPT

## 2024-03-27 PROCEDURE — 97112 NEUROMUSCULAR REEDUCATION: CPT

## 2024-03-27 NOTE — FLOWSHEET NOTE
[facility]- Outpatient Rehabilitation and Therapy [address] [place] [address] office: [phone] fax: [phone]         Physical Therapy: TREATMENT/PROGRESS NOTE   Patient: Ramses Castro (54 y.o. male)   Examination Date: 2024   :  1970 MRN: [id number]   Visit #: 5   Insurance Allowable Auth Needed   Med Nec []Yes    []No    Insurance: Payor: AETNA / Plan: AETNA / Product Type: *No Product type* /   Insurance ID: [id number] - (Commercial)  Secondary Insurance (if applicable):    Treatment Diagnosis:     ICD-10-CM    1. Neck pain  M54.2       2. Neck stiffness  M43.6          Medical Diagnosis:  Cervical spondylosis [M47.812]   Referring Physician: Tito Quezada MD  PCP: Gypsy Aviles PA-C       Plan of care signed (Y/N):     Date of Patient follow up with Physician:      Progress Report/POC: NO  POC update due: (10 visits /OR AUTH LIMITS, whichever is less)  4/10/2024                                             Precautions/ Contra-indications:           Latex allergy:  NO  Pacemaker:    NO  Contraindications for Manipulation: None  Date of Surgery: n/a  Other:    Red Flags:  None    C-SSRS Triggered by Intake questionnaire:   [x] No, Questionnaire did not trigger screening.   [] Yes, Patient intake triggered further evaluation      [] C-SSRS Screening completed  [] PCP notified via Plan of Care  [] Emergency services notified     Preferred Language for Healthcare:   [x] English       [] other:    SUBJECTIVE EXAMINATION     Patient stated complaint: Pt reports that he is having pain in his neck where they preformed the EMG. Pt says that he will receive results in 2-3 days       Test used Initial score  3/11/24 2024   Pain Summary VAS 4/10 5/10 in the neck  4/10 in the hand    Functional questionnaire Neck Disability Index 8/50  16%    Other:                   Exercises/Interventions     Therapeutic Ex (72536)  resistance Sets/time Reps Notes/Cues/Progressions

## 2024-04-02 ENCOUNTER — HOSPITAL ENCOUNTER (OUTPATIENT)
Dept: PHYSICAL THERAPY | Age: 54
Setting detail: THERAPIES SERIES
End: 2024-04-02
Payer: COMMERCIAL

## 2024-04-04 ENCOUNTER — HOSPITAL ENCOUNTER (OUTPATIENT)
Dept: PHYSICAL THERAPY | Age: 54
Setting detail: THERAPIES SERIES
Discharge: HOME | End: 2024-04-04
Payer: COMMERCIAL

## 2024-04-04 PROCEDURE — 97112 NEUROMUSCULAR REEDUCATION: CPT

## 2024-04-04 PROCEDURE — 97110 THERAPEUTIC EXERCISES: CPT

## 2024-04-04 PROCEDURE — 97140 MANUAL THERAPY 1/> REGIONS: CPT

## 2024-04-04 NOTE — FLOWSHEET NOTE
[facility]- Outpatient Rehabilitation and Therapy [address] [place] [address] office: [phone] fax: [phone]         Physical Therapy: TREATMENT/PROGRESS NOTE   Patient: Ramses Castro (54 y.o. male)   Examination Date: 2024   :  1970 MRN: [id number]   Visit #: 6   Insurance Allowable Auth Needed   Med Nec []Yes    []No    Insurance: Payor: AETNA / Plan: AETNA / Product Type: *No Product type* /   Insurance ID: [id number] - (Commercial)  Secondary Insurance (if applicable):    Treatment Diagnosis:     ICD-10-CM    1. Neck pain  M54.2       2. Neck stiffness  M43.6          Medical Diagnosis:  Cervical spondylosis [M47.812]   Referring Physician: Tito Quezada MD  PCP: Gypsy Aviles PA-C       Plan of care signed (Y/N):     Date of Patient follow up with Physician:      Progress Report/POC: NO  POC update due: (10 visits /OR AUTH LIMITS, whichever is less)  4/10/2024                                             Precautions/ Contra-indications:           Latex allergy:  NO  Pacemaker:    NO  Contraindications for Manipulation: None  Date of Surgery: n/a  Other:    Red Flags:  None    C-SSRS Triggered by Intake questionnaire:   [x] No, Questionnaire did not trigger screening.   [] Yes, Patient intake triggered further evaluation      [] C-SSRS Screening completed  [] PCP notified via Plan of Care  [] Emergency services notified     Preferred Language for Healthcare:   [x] English       [] other:    SUBJECTIVE EXAMINATION     Patient stated complaint:  (PN NV) Pt reports increased sharp \"stabbing\" pain in his R hand. Pt states that his workload has increased at his job increasing his symptoms. Pt says he will call his doctor for the results of his EMG.     Test used Initial score  3/11/24 2024   Pain Summary VAS 4/10 4/10 in the neck  7/10 in the R hand    Functional questionnaire Neck Disability Index 8/50  16%    Other:                   Exercises/Interventions

## 2024-04-09 ENCOUNTER — HOSPITAL ENCOUNTER (OUTPATIENT)
Dept: PHYSICAL THERAPY | Age: 54
Setting detail: THERAPIES SERIES
Discharge: HOME | End: 2024-04-09
Payer: COMMERCIAL

## 2024-04-09 PROCEDURE — 97110 THERAPEUTIC EXERCISES: CPT | Performed by: PHYSICAL THERAPY ASSISTANT

## 2024-04-09 PROCEDURE — 97140 MANUAL THERAPY 1/> REGIONS: CPT | Performed by: PHYSICAL THERAPY ASSISTANT

## 2024-04-09 PROCEDURE — 97112 NEUROMUSCULAR REEDUCATION: CPT | Performed by: PHYSICAL THERAPY ASSISTANT

## 2024-04-09 NOTE — PLAN OF CARE
He is right hand dominant but left hand has more  strength currently.  He had to give up bowling as he could not hold his ball.  He notes that he is struggling with work activities secondary to lifting a lot of weight repetitively at work.     Test used Initial score  3/11/24 2024   Pain Summary VAS 4/10 3/10 in the neck  5/10 in the R hand with repetitive use, left hand numbness with repetitive use   Functional questionnaire Neck Disability Index   16%   18%   ROM:    Shoulder flexion right 145, left 147  Shoulder ABD right 134, left 118   Cervical flexion 18   Cervical extension 17  Cervical Sidebending Left 32, Right 30  Cervical Rotation Left 35, Right 35   Strength:     Shoulder flexion right  5/5, left 5/5   Shoulder ABD right 5/5, left 5/5   Shoulder IR right 5/5, left  5/5  Shoulder ER right 5/5 , left 5/5     Strength:  right 73lbs, left 80       Overall Response to Treatment:   []Patient is responding well to treatment and improvement is noted with regards to goals   []Patient should continue to improve in reasonable time if they continue HEP   []Patient has plateaued and is no longer responding to skilled PT intervention    [x]Patient is unchanged and would benefit from return to referring MD   []Patient unable to adhere to initial POC   []Other:     Total Visits: 7     Recommendation:    [x] Continue PT 1-2x / wk for 4-6 weeks.   [] Hold PT, pending MD visit   [] Discharge to HEP. Follow up with PT or MD PRN.       Physical Therapy: TREATMENT/PROGRESS NOTE   Patient: Ramses Castro (54 y.o. male)   Examination Date: 2024   :  1970 MRN: [id number]   Visit #: 7   Insurance Allowable Auth Needed   Med Nec []Yes    []No    Insurance: Payor: AETNA / Plan: AETNA / Product Type: *No Product type* /   Insurance ID: [id number] - (Commercial)  Secondary Insurance (if applicable):    Treatment Diagnosis:     ICD-10-CM    1. Neck pain  M54.2       2. Neck stiffness  M43.6

## 2024-04-11 ENCOUNTER — HOSPITAL ENCOUNTER (OUTPATIENT)
Dept: PHYSICAL THERAPY | Age: 54
Setting detail: THERAPIES SERIES
Discharge: HOME | End: 2024-04-11
Payer: COMMERCIAL

## 2024-04-11 PROCEDURE — 97140 MANUAL THERAPY 1/> REGIONS: CPT

## 2024-04-11 PROCEDURE — 97110 THERAPEUTIC EXERCISES: CPT

## 2024-04-11 PROCEDURE — 97112 NEUROMUSCULAR REEDUCATION: CPT

## 2024-04-11 NOTE — FLOWSHEET NOTE
Dry Needle 1-2 muscle (01835)     Aquatic Therex (83754)    Dry Needle 3+ muscle (75597)     Iontophoresis (35438)    VASO (55017)     Ultrasound (35864)    Group Therapy (59560)     Estim Attended (63071)    Canalith Repositioning (84135)     Other:    Other:     59' 4       Total Treatment Minutes 59'        Charge Justification:  (49996) THERAPEUTIC EXERCISE - Provided verbal/tactile cueing for activities related to strengthening, flexibility, endurance, ROM performed to prevent loss of range of motion, maintain or improve muscular strength or increase flexibility, following either an injury or surgery.   (94909) NEUROMUSCULAR RE-EDUCATION - Therapeutic procedure, 1 or more areas, each 15 minutes; neuromuscular reeducation of movement, balance, coordination, kinesthetic sense, posture, and/or proprioception for sitting and/or standing activities  (11045) MANUAL THERAPY -  Manual therapy techniques, 1 or more regions, each 15 minutes (Mobilization/manipulation, manual lymphatic drainage, manual traction) for the purpose of modulating pain, promoting relaxation,  increasing ROM, reducing/eliminating soft tissue swelling/inflammation/restriction, improving soft tissue extensibility and allowing for proper ROM for normal function with self care, mobility, lifting and ambulation      GOALS     Patient stated goal: To be able to return to PLOF.   Status:  [] Progressing: [] Met: [] Not Met: [] Adjusted    Therapist goals for Patient:   Short Term Goals: To be achieved in: 2 weeks  Independent in HEP and progression per patient tolerance, in order to progress toward full function and prevent re-injury.    Status: [] Progressing: [x] Met: [] Not Met: [] Adjusted  Patient will have a decrease in pain to 2/10 to help facilitate improvement in movement, function, and ADLs as indicated by functional deficits.   Status: [x] Progressing: [] Met: [] Not Met: [] Adjusted    Long Term Goals: To be achieved in:  12

## 2024-04-17 ENCOUNTER — APPOINTMENT (OUTPATIENT)
Dept: PHYSICAL THERAPY | Age: 54
End: 2024-04-17
Payer: COMMERCIAL

## 2024-04-18 ENCOUNTER — HOSPITAL ENCOUNTER (OUTPATIENT)
Dept: PHYSICAL THERAPY | Age: 54
Setting detail: THERAPIES SERIES
Discharge: HOME | End: 2024-04-18
Payer: COMMERCIAL

## 2024-04-18 PROCEDURE — 97110 THERAPEUTIC EXERCISES: CPT

## 2024-04-18 PROCEDURE — 97140 MANUAL THERAPY 1/> REGIONS: CPT

## 2024-04-18 NOTE — FLOWSHEET NOTE
of function.        Status: [] Progressing: [] Met: [] Not Met: [] Adjusted  Patient will be able to manage symptoms while resuming full duty at work.             Status: [] Progressing: [] Met: [] Not Met: [] Adjusted    TREATMENT PLAN     Frequency/Duration: 1-2x/week for  12  weeks for the following treatment interventions:    Interventions:  [x] Therapeutic exercise including: strength training, ROM, including postural re-education.   [x] NMR activation and proprioception, including postural re-education.    [x] Manual therapy as indicated to include: PROM, Gr I-IV mobilizations, and STM  [x] Modalities as needed that may include: Cryotherapy, Electrical Stimulation, and Thermal Agents  [x] Patient education on joint protection, postural re-education, activity modification, progression of HEP.        [] Aquatic Therapy    Plan: Cont POC- Continue emphasis/focus on modulating pain, promoting relaxation, increasing ROM, and improving postural awareness. Next visit plan to look to Dry Needle or other manual technique and continue current phase     Electronically Signed by Eunice Patel  Date: 04/18/2024     Note: Portions of this note have been templated and/or copied from initial evaluation, reassessments and prior notes for documentation efficiency.

## 2024-04-24 ENCOUNTER — HOSPITAL ENCOUNTER (OUTPATIENT)
Dept: PHYSICAL THERAPY | Age: 54
Setting detail: THERAPIES SERIES
Discharge: HOME | End: 2024-04-24
Payer: COMMERCIAL

## 2024-04-24 PROCEDURE — 20560 NDL INSJ W/O NJX 1 OR 2 MUSC: CPT

## 2024-04-24 PROCEDURE — 97110 THERAPEUTIC EXERCISES: CPT

## 2024-04-24 PROCEDURE — 97140 MANUAL THERAPY 1/> REGIONS: CPT

## 2024-04-24 NOTE — PLAN OF CARE
strength training, ROM, including postural re-education.   [x] NMR activation and proprioception, including postural re-education.    [x] Manual therapy as indicated to include: PROM, Gr I-IV mobilizations, and STM  [x] Modalities as needed that may include: Cryotherapy, Electrical Stimulation, and Thermal Agents  [x] Patient education on joint protection, postural re-education, activity modification, progression of HEP.        [] Aquatic Therapy    Plan: Hold pending MD visit; patient would benefit from continue PT to continue with DN do to twitches elicited     Electronically Signed by Eunice Patel  Date: 04/24/2024     Note: Portions of this note have been templated and/or copied from initial evaluation, reassessments and prior notes for documentation efficiency.

## 2024-04-25 ENCOUNTER — OFFICE VISIT (OUTPATIENT)
Dept: ORTHOPEDIC SURGERY | Age: 54
End: 2024-04-25
Payer: COMMERCIAL

## 2024-04-25 ENCOUNTER — TELEPHONE (OUTPATIENT)
Dept: ORTHOPEDIC SURGERY | Age: 54
End: 2024-04-25

## 2024-04-25 VITALS — BODY MASS INDEX: 19.88 KG/M2 | HEIGHT: 70 IN | WEIGHT: 138.89 LBS

## 2024-04-25 DIAGNOSIS — M47.22 CERVICAL SPONDYLOSIS WITH RADICULOPATHY: ICD-10-CM

## 2024-04-25 DIAGNOSIS — M47.812 CERVICAL SPONDYLOSIS: Primary | ICD-10-CM

## 2024-04-25 PROCEDURE — 99212 OFFICE O/P EST SF 10 MIN: CPT | Performed by: ORTHOPAEDIC SURGERY

## 2024-04-25 NOTE — PROGRESS NOTES
tenderness, deformity or injury. Range of motion is unremarkable. There is no gross instability.  There are no rashes, ulcerations or lesions. Strength and tone are normal.    Diagnostic Testing:  I reviewed AP and lateral x-rays of cervical spine obtained the office 2/28/24.  Those show spondylosis    I reviewed AP and lateral x-ray of his chest from 9/8/2012 in the office today.  Those show thoracic spondylosis    I reviewed a basic metabolic panel from 4/9/2020 in the office today.  Glucose 118    Impression:   Cervical spondylosis    Plan:    Discussed treatment options including observation, physical therapy, medications, epidural injections and additional imaging.  He will continue physical therapy and will obtain a cervical MRI

## 2024-04-25 NOTE — TELEPHONE ENCOUNTER
[facility] Clinical Staff  Approved for MRI of Cervical Spine  Approval Letter in Media  Approved Facility: [facility]  Approval Dates: 04.25.24 to 10.22.24  Auth #: [id number]     Attempted to leave VM for patient. VM box not set up.

## 2024-04-30 ENCOUNTER — HOSPITAL ENCOUNTER (OUTPATIENT)
Dept: PHYSICAL THERAPY | Age: 54
Setting detail: THERAPIES SERIES
Discharge: HOME | End: 2024-04-30
Payer: COMMERCIAL

## 2024-04-30 PROCEDURE — 97112 NEUROMUSCULAR REEDUCATION: CPT

## 2024-04-30 PROCEDURE — 97110 THERAPEUTIC EXERCISES: CPT

## 2024-04-30 PROCEDURE — 20560 NDL INSJ W/O NJX 1 OR 2 MUSC: CPT

## 2024-04-30 PROCEDURE — 97140 MANUAL THERAPY 1/> REGIONS: CPT

## 2024-04-30 NOTE — FLOWSHEET NOTE
joint protection, postural re-education, activity modification, progression of HEP.        [] Aquatic Therapy    Plan: Cont POC- Continue emphasis/focus on exercise progression, modulating pain, promoting relaxation, increasing ROM, reduce/eliminate soft tissue swelling/inflammation/restriction, and improving soft tissue extensibility. Next visit plan to progress reps, add new exercises, and progress balance ;   Electronically Signed by Alexandria Gustafson, PT  Date: 04/30/2024     Note: Portions of this note have been templated and/or copied from initial evaluation, reassessments and prior notes for documentation efficiency.

## 2024-05-01 ENCOUNTER — HOSPITAL ENCOUNTER (OUTPATIENT)
Dept: MRI IMAGING | Age: 54
Discharge: HOME | End: 2024-05-01
Attending: ORTHOPAEDIC SURGERY
Payer: COMMERCIAL

## 2024-05-01 DIAGNOSIS — M47.22 CERVICAL SPONDYLOSIS WITH RADICULOPATHY: ICD-10-CM

## 2024-05-01 PROCEDURE — 72141 MRI NECK SPINE W/O DYE: CPT

## 2024-05-06 ENCOUNTER — HOSPITAL ENCOUNTER (OUTPATIENT)
Dept: PHYSICAL THERAPY | Age: 54
Setting detail: THERAPIES SERIES
Discharge: HOME | End: 2024-05-06
Payer: COMMERCIAL

## 2024-05-06 PROCEDURE — 20560 NDL INSJ W/O NJX 1 OR 2 MUSC: CPT

## 2024-05-06 PROCEDURE — 97110 THERAPEUTIC EXERCISES: CPT | Performed by: PHYSICAL THERAPY ASSISTANT

## 2024-05-06 PROCEDURE — 97140 MANUAL THERAPY 1/> REGIONS: CPT | Performed by: PHYSICAL THERAPY ASSISTANT

## 2024-05-06 NOTE — FLOWSHEET NOTE
therapy and significantly impacts the rate of recovery.       Return to Play: NA    Prognosis for POC: [x] Good [] Fair  [] Poor    Patient requires continued skilled intervention: [x] Yes  [] No      CHARGE CAPTURE     PT CHARGE GRID   CPT Code (TIMED) minutes # CPT Code (UNTIMED) #     Therex (67512)  30 2  EVAL:LOW (66346 - Typically 20 minutes face-to-face)     Neuromusc. Re-ed (20150)    Re-Eval (35351)     Manual (11493) 15 1  Estim Unattended (86306)     Ther. Act (76860)    Mech. Traction (47660)     Gait (24997)    Dry Needle 1-2 muscle (64916) x    Aquatic Therex (40204)    Dry Needle 3+ muscle (79155)     Iontophoresis (93824)    VASO (10864)     Ultrasound (56825)    Group Therapy (10101)     Estim Attended (97326)    Canalith Repositioning (54198)     Other:    Other:      45' 3  1     Total Treatment Minutes 60'        Charge Justification:  (61862) THERAPEUTIC EXERCISE - Provided verbal/tactile cueing for activities related to strengthening, flexibility, endurance, ROM performed to prevent loss of range of motion, maintain or improve muscular strength or increase flexibility, following either an injury or surgery.   (93888) NEUROMUSCULAR RE-EDUCATION - Therapeutic procedure, 1 or more areas, each 15 minutes; neuromuscular reeducation of movement, balance, coordination, kinesthetic sense, posture, and/or proprioception for sitting and/or standing activities  (58872) MANUAL THERAPY -  Manual therapy techniques, 1 or more regions, each 15 minutes (Mobilization/manipulation, manual lymphatic drainage, manual traction) for the purpose of modulating pain, promoting relaxation,  increasing ROM, reducing/eliminating soft tissue swelling/inflammation/restriction, improving soft tissue extensibility and allowing for proper ROM for normal function with self care, mobility, lifting and ambulation  (26710) Needle insertion(s) without injection; 1 or 2 muscle(s).      GOALS     Patient stated goal: To be able to

## 2024-05-08 ENCOUNTER — TELEPHONE (OUTPATIENT)
Dept: VASCULAR SURGERY | Age: 54
End: 2024-05-08

## 2024-05-08 NOTE — TELEPHONE ENCOUNTER
Pt called to schedule appt with Dr. Gallardo. Pt was referred by his PCP, Gypsy DANIEL. No referral originally in the system due to fax machines being down in the office. I called PCP office and had them fax the referral to an alternate fax number and this was just received. Referral scanned into Epic. Pt currently scheduled with Dr. Gallardo on 6/7/24. If a sooner appt is needed, please call pt back at [phone].        Please advise.

## 2024-05-09 ENCOUNTER — HOSPITAL ENCOUNTER (OUTPATIENT)
Dept: PHYSICAL THERAPY | Age: 54
Setting detail: THERAPIES SERIES
Discharge: HOME | End: 2024-05-09
Payer: COMMERCIAL

## 2024-05-09 ENCOUNTER — TELEPHONE (OUTPATIENT)
Dept: VASCULAR SURGERY | Age: 54
End: 2024-05-09

## 2024-05-09 ENCOUNTER — OFFICE VISIT (OUTPATIENT)
Dept: ORTHOPEDIC SURGERY | Age: 54
End: 2024-05-09
Payer: COMMERCIAL

## 2024-05-09 VITALS — WEIGHT: 149.91 LBS | HEIGHT: 71 IN | BODY MASS INDEX: 20.99 KG/M2

## 2024-05-09 DIAGNOSIS — M47.22 CERVICAL SPONDYLOSIS WITH RADICULOPATHY: Primary | ICD-10-CM

## 2024-05-09 PROCEDURE — 97110 THERAPEUTIC EXERCISES: CPT

## 2024-05-09 PROCEDURE — 97140 MANUAL THERAPY 1/> REGIONS: CPT

## 2024-05-09 PROCEDURE — 99214 OFFICE O/P EST MOD 30 MIN: CPT | Performed by: ORTHOPAEDIC SURGERY

## 2024-05-09 PROCEDURE — 20560 NDL INSJ W/O NJX 1 OR 2 MUSC: CPT

## 2024-05-09 NOTE — PROGRESS NOTES
New Patient: CERVICAL SPINE    Referring Provider:  No ref. provider found    CHIEF COMPLAINT:    Chief Complaint   Patient presents with    Follow-up     Cervical MRI Review       HISTORY OF PRESENT ILLNESS:   Mr. Ramses Castro is a pleasant 54 y.o. old male since today with a 3 month history of neck and bilateral arm pain.  His pain is somewhat better with physical therapy but has plateaued in an unacceptable level.  He rates his pain 6/10.  He was numbness tingling and weakness of his arms.. He denies loss of fine motor control, lower extremity symptoms, gait abnormality and bowel or bladder dysfunction.      Current/Past Treatment:   Physical Therapy: yes  Chiropractic: No  Injection: No  Medications: Tylenol and Motrin    Past Medical History:   Past Medical History:   Diagnosis Date    Asthma       Past Surgical History:     Past Surgical History:   Procedure Laterality Date    FOOT FRACTURE SURGERY Right 4/30/2020    OPEN REDUCTION AND PINNING RIGHT 2ND AND 3RD TOE FRACTURES performed by Guzman Pan MD at [facility] OR    SHOULDER SURGERY      left     Current Medications:     Current Outpatient Medications:     acetaminophen (TYLENOL) 500 MG tablet, Take 500 mg by mouth every 6 hours as needed for Pain, Disp: , Rfl:   Allergies:  Peanut butter flavor and Morphine  Social History:    reports that he quit smoking about 10 years ago. His smoking use included cigarettes. He has never used smokeless tobacco. He reports current alcohol use. He reports that he does not use drugs.  Family History:   No family history on file.    REVIEW OF SYSTEMS: Full ROS noted & scanned   CONSTITUTIONAL: Denies unexplained weight loss, fevers, chills or fatigue  NEUROLOGICAL: Denies unsteady gait or progressive weakness  MUSCULOSKELETAL: Denies joint swelling or redness  PSYCHOLOGICAL: Denies anxiety, depression   SKIN: Denies skin changes, delayed healing, rash, itching   HEMATOLOGIC: Denies easy bleeding or bruising  ENDOCRINE:

## 2024-05-09 NOTE — FLOWSHEET NOTE
[facility]- Outpatient Rehabilitation and Therapy [address]. [place] [address] office: [phone] fax: [phone]      Physical Therapy: TREATMENT/PROGRESS NOTE   Patient: Ramses Castro (54 y.o. male)   Examination Date: 2024   :  1970 MRN: [id number]   Visit #: 13   Insurance Allowable Auth Needed   Med Nec []Yes    []No    Insurance: Payor: AETNA / Plan: AETNA / Product Type: *No Product type* /   Insurance ID: [id number] - (Commercial)  Secondary Insurance (if applicable):    Treatment Diagnosis:     ICD-10-CM    1. Neck pain  M54.2       2. Neck stiffness  M43.6          Medical Diagnosis:  Cervical spondylosis [M47.812]   Referring Physician: Tito Quezada MD  PCP: Gypsy Aviles PA-C       Plan of care signed (Y/N): Y    Date of Patient follow up with Physician: 2024     Progress Report/POC:  No  POC update due: (10 visits /OR AUTH LIMITS, whichever is less)  2024                                             Precautions/ Contra-indications:           Latex allergy:  NO  Pacemaker:    NO  Contraindications for Manipulation: None  Date of Surgery: n/a  Other:    Red Flags:  None    C-SSRS Triggered by Intake questionnaire:   [x] No, Questionnaire did not trigger screening.   [] Yes, Patient intake triggered further evaluation      [] C-SSRS Screening completed  [] PCP notified via Plan of Care  [] Emergency services notified     Preferred Language for Healthcare:   [x] English       [] other:    SUBJECTIVE EXAMINATION     Patient stated complaint: Pt reports having to set up an appointment to see someone for injections      Test used Initial score  3/11/24 2024   Pain Summary VAS 10 10   Functional questionnaire Neck Disability Index   16%   14%   ROM:    Cervical  Flex:25  Ext: 34  R rot:42  L rot: 45    Shoulder  R Flex 115  L Flex: 112  R abd: 92  L abd: 90      Strength:     Shoulder   Flexion R&L: 4/5          Exercises/Interventions

## 2024-05-09 NOTE — TELEPHONE ENCOUNTER
Returned call to patient regarding vertebral artery stenosis and getting apt sooner with Dr Gallardo. Jack

## 2024-05-13 ENCOUNTER — HOSPITAL ENCOUNTER (OUTPATIENT)
Dept: PHYSICAL THERAPY | Age: 54
Setting detail: THERAPIES SERIES
End: 2024-05-13
Payer: COMMERCIAL

## 2024-05-13 DIAGNOSIS — I65.09 VERTEBRAL ARTERY STENOSIS, UNSPECIFIED LATERALITY: Primary | ICD-10-CM

## 2024-05-13 DIAGNOSIS — I65.23 BILATERAL CAROTID ARTERY STENOSIS: Primary | ICD-10-CM

## 2024-05-14 ENCOUNTER — TELEPHONE (OUTPATIENT)
Dept: VASCULAR SURGERY | Age: 54
End: 2024-05-14

## 2024-05-14 NOTE — TELEPHONE ENCOUNTER
Called patient with date and time of carotid duplex scan scheduled at [facility] for Friday May 17, 2024 at 2:30pm and arrive at 2:00pm. Jack

## 2024-05-15 ENCOUNTER — HOSPITAL ENCOUNTER (OUTPATIENT)
Dept: PHYSICAL THERAPY | Age: 54
Setting detail: THERAPIES SERIES
Discharge: HOME | End: 2024-05-15
Payer: COMMERCIAL

## 2024-05-15 PROCEDURE — 97112 NEUROMUSCULAR REEDUCATION: CPT

## 2024-05-15 PROCEDURE — 97140 MANUAL THERAPY 1/> REGIONS: CPT

## 2024-05-15 PROCEDURE — 20560 NDL INSJ W/O NJX 1 OR 2 MUSC: CPT

## 2024-05-15 PROCEDURE — 97110 THERAPEUTIC EXERCISES: CPT

## 2024-05-15 NOTE — FLOWSHEET NOTE
[facility]- Outpatient Rehabilitation and Therapy [address] [place] [address] office: [phone] fax: [phone]      Physical Therapy: TREATMENT/PROGRESS NOTE   Patient: Ramses Castro (54 y.o. male)   Examination Date: 05/15/2024   :  1970 MRN: [id number]   Visit #: 14   Insurance Allowable Auth Needed   Med Nec []Yes    []No    Insurance: Payor: AETNA / Plan: AETNA / Product Type: *No Product type* /   Insurance ID: [id number] - (Commercial)  Secondary Insurance (if applicable):    Treatment Diagnosis:     ICD-10-CM    1. Neck pain  M54.2       2. Neck stiffness  M43.6          Medical Diagnosis:  Cervical spondylosis [M47.812]   Referring Physician: Tito Quezada MD  PCP: Gypsy Aviles PA-C       Plan of care signed (Y/N): Y    Date of Patient follow up with Physician: 2024     Progress Report/POC:  No  POC update due: (10 visits /OR AUTH LIMITS, whichever is less)  2024                                             Precautions/ Contra-indications:           Latex allergy:  NO  Pacemaker:    NO  Contraindications for Manipulation: None  Date of Surgery: n/a  Other:    Red Flags:  None    C-SSRS Triggered by Intake questionnaire:   [x] No, Questionnaire did not trigger screening.   [] Yes, Patient intake triggered further evaluation      [] C-SSRS Screening completed  [] PCP notified via Plan of Care  [] Emergency services notified     Preferred Language for Healthcare:   [x] English       [] other:    SUBJECTIVE EXAMINATION     Patient stated complaint: Pt reports having had a rough week work wise.     Test used Initial score  3/11/24 2024   Pain Summary VAS 4/10 6-7/10   Functional questionnaire Neck Disability Index   16%   14%   ROM:    Cervical  Flex:25  Ext: 34  R rot:42  L rot: 45    Shoulder  R Flex 115  L Flex: 112  R abd: 92  L abd: 90      Strength:     Shoulder   Flexion R&L: 4/5          Exercises/Interventions         Therapeutic Ex

## 2024-05-17 ENCOUNTER — HOSPITAL ENCOUNTER (OUTPATIENT)
Dept: VASCULAR LAB | Age: 54
Discharge: HOME | End: 2024-05-17
Attending: SURGERY
Payer: COMMERCIAL

## 2024-05-17 DIAGNOSIS — I65.09 VERTEBRAL ARTERY STENOSIS, UNSPECIFIED LATERALITY: ICD-10-CM

## 2024-05-17 DIAGNOSIS — R42 DIZZINESS: Primary | ICD-10-CM

## 2024-05-17 LAB
VAS LEFT ARM BP: 113 MMHG
VAS LEFT CCA DIST EDV: 37.6 CM/S
VAS LEFT CCA DIST PSV: 103 CM/S
VAS LEFT CCA MID EDV: 35.3 CM/S
VAS LEFT CCA MID PSV: 95.6 CM/S
VAS LEFT CCA PROX EDV: 40.7 CM/S
VAS LEFT CCA PROX PSV: 133 CM/S
VAS LEFT ECA EDV: 13.6 CM/S
VAS LEFT ECA PSV: 71.6 CM/S
VAS LEFT ICA DIST EDV: 40 CM/S
VAS LEFT ICA DIST PSV: 85 CM/S
VAS LEFT ICA MID EDV: 29.2 CM/S
VAS LEFT ICA MID PSV: 75.8 CM/S
VAS LEFT ICA PROX EDV: 23 CM/S
VAS LEFT ICA PROX PSV: 82 CM/S
VAS LEFT ICA/CCA PSV: 0.89
VAS LEFT SUBCLAVIAN PROX EDV: 0 CM/S
VAS LEFT SUBCLAVIAN PROX PSV: 133 CM/S
VAS LEFT VERTEBRAL EDV: 14.5 CM/S
VAS LEFT VERTEBRAL PSV: 23.1 CM/S
VAS RIGHT ARM BP: 113 MMHG
VAS RIGHT CCA DIST EDV: 40.6 CM/S
VAS RIGHT CCA DIST PSV: 124 CM/S
VAS RIGHT CCA MID EDV: 42.8 CM/S
VAS RIGHT CCA MID PSV: 144 CM/S
VAS RIGHT CCA PROX EDV: 46.1 CM/S
VAS RIGHT CCA PROX PSV: 158 CM/S
VAS RIGHT ECA PSV: 103 CM/S
VAS RIGHT ICA DIST EDV: 51 CM/S
VAS RIGHT ICA DIST PSV: 101 CM/S
VAS RIGHT ICA MID EDV: 38.4 CM/S
VAS RIGHT ICA MID PSV: 76.8 CM/S
VAS RIGHT ICA PROX EDV: 29 CM/S
VAS RIGHT ICA PROX PSV: 70.6 CM/S
VAS RIGHT ICA/CCA PSV: 0.7
VAS RIGHT SUBCLAVIAN PROX EDV: 0 CM/S
VAS RIGHT SUBCLAVIAN PROX PSV: 142 CM/S
VAS RIGHT VERTEBRAL EDV: 26.7 CM/S
VAS RIGHT VERTEBRAL PSV: 77.6 CM/S

## 2024-05-17 PROCEDURE — 93880 EXTRACRANIAL BILAT STUDY: CPT

## 2024-05-17 PROCEDURE — 93880 EXTRACRANIAL BILAT STUDY: CPT | Performed by: INTERNAL MEDICINE

## 2024-05-20 ENCOUNTER — HOSPITAL ENCOUNTER (OUTPATIENT)
Dept: PHYSICAL THERAPY | Age: 54
Setting detail: THERAPIES SERIES
Discharge: HOME | End: 2024-05-20
Payer: COMMERCIAL

## 2024-05-20 PROCEDURE — 97110 THERAPEUTIC EXERCISES: CPT

## 2024-05-20 PROCEDURE — 97112 NEUROMUSCULAR REEDUCATION: CPT

## 2024-05-20 PROCEDURE — 20560 NDL INSJ W/O NJX 1 OR 2 MUSC: CPT

## 2024-05-20 PROCEDURE — 97140 MANUAL THERAPY 1/> REGIONS: CPT

## 2024-05-20 NOTE — FLOWSHEET NOTE
PT CHARGE GRID   CPT Code (TIMED) minutes # CPT Code (UNTIMED) #     Therex (78316)  20' 1  EVAL:LOW (70212 - Typically 20 minutes face-to-face)     Neuromusc. Re-ed (48059) 10' 1  Re-Eval (36113)     Manual (73614) 20' 1  Estim Unattended (57287)     Ther. Act (71528)    Mech. Traction (21374)     Gait (83345)    Dry Needle 1-2 muscle (49890) 1    Aquatic Therex (27478)    Dry Needle 3+ muscle (51958)     Iontophoresis (73984)    VASO (06812)     Ultrasound (33645)    Group Therapy (60919)     Estim Attended (29477)    Canalith Repositioning (75458)     Other:    Other:      45' 3  1     Total Treatment Minutes 55'        Charge Justification:  (75875) THERAPEUTIC EXERCISE - Provided verbal/tactile cueing for activities related to strengthening, flexibility, endurance, ROM performed to prevent loss of range of motion, maintain or improve muscular strength or increase flexibility, following either an injury or surgery.   (45718) NEUROMUSCULAR RE-EDUCATION - Therapeutic procedure, 1 or more areas, each 15 minutes; neuromuscular reeducation of movement, balance, coordination, kinesthetic sense, posture, and/or proprioception for sitting and/or standing activities  (68105) MANUAL THERAPY -  Manual therapy techniques, 1 or more regions, each 15 minutes (Mobilization/manipulation, manual lymphatic drainage, manual traction) for the purpose of modulating pain, promoting relaxation,  increasing ROM, reducing/eliminating soft tissue swelling/inflammation/restriction, improving soft tissue extensibility and allowing for proper ROM for normal function with self care, mobility, lifting and ambulation  (46011) Needle insertion(s) without injection; 1 or 2 muscle(s).      GOALS     Patient stated goal: To be able to return to PLOF.   Status:  [] Progressing: [] Met: [] Not Met: [] Adjusted    Therapist goals for Patient:   Short Term Goals: To be achieved in: 2 weeks  Independent in HEP and progression per patient tolerance,

## 2024-05-22 ENCOUNTER — HOSPITAL ENCOUNTER (OUTPATIENT)
Dept: PHYSICAL THERAPY | Age: 54
Setting detail: THERAPIES SERIES
Discharge: HOME | End: 2024-05-22
Payer: COMMERCIAL

## 2024-05-22 PROCEDURE — 97112 NEUROMUSCULAR REEDUCATION: CPT

## 2024-05-22 PROCEDURE — 97110 THERAPEUTIC EXERCISES: CPT

## 2024-05-22 PROCEDURE — 20560 NDL INSJ W/O NJX 1 OR 2 MUSC: CPT

## 2024-05-22 PROCEDURE — 97140 MANUAL THERAPY 1/> REGIONS: CPT

## 2024-05-22 NOTE — PLAN OF CARE
03/11/2024  Prepared by: Alexandria Gustafson    Exercises  - Supine Chin Tuck  - 1 x daily - 7 x weekly - 1 sets - 10 reps - 5\" hold  - Supine Cervical Rotation AROM on Pillow  - 1 x daily - 7 x weekly - 1 sets - 15 reps  - Supine Head Nod with Deep Neck Flexor Activation  - 1-3 x daily - 7 x weekly - 1 sets - 1 reps - 3-5' hold  - Supine Scapular Retraction  - 1 x daily - 7 x weekly - 2 sets - 10 reps  - Standing Upper Trapezius Stretch  - 1 x daily - 7 x weekly - 1 sets - 3 reps - 15\" hold  - Gentle Levator Scapulae Stretch  - 1 x daily - 7 x weekly - 1 sets - 3 reps - 15\" hold  - Seated Scapular Retraction  - 1 x daily - 7 x weekly - 3 sets - 10 reps      ASSESSMENT   Today's Assessment: POC/PROGRESS UPDATE: Pt. continues to present with functional deficits in flexibility and muscle activation  limiting ability with reaching overhead, carrying items, lifting items, and pushing or pulling activity .  During therapy this date, patient required verbal cueing for modulating pain. Patient will continue to benefit from ongoing evaluation and advanced clinical decision from a Physical Therapist to improve pain control and ROM to safely return to work/work related tasks and recreational activity without symptoms or restrictions.  Tolerates fair.          Medical Necessity Documentation:  I certify that this patient meets the below criteria necessary for medical necessity for care and/or justification of therapy services:  The patient has a musculoskeletal condition(s) with a corresponding ICD-10 code that is of complexity and severity that require skilled therapeutic intervention. This has a direct and significant impact on the need for therapy and significantly impacts the rate of recovery.       Return to Play: NA    Prognosis for POC: [x] Good [] Fair  [] Poor    Patient requires continued skilled intervention: [x] Yes  [] No      CHARGE CAPTURE     PT CHARGE GRID   CPT Code (TIMED) minutes # CPT Code (UNTIMED) #

## 2024-05-29 ENCOUNTER — HOSPITAL ENCOUNTER (OUTPATIENT)
Dept: PHYSICAL THERAPY | Age: 54
Setting detail: THERAPIES SERIES
Discharge: HOME | End: 2024-05-29
Payer: COMMERCIAL

## 2024-05-29 PROCEDURE — 97112 NEUROMUSCULAR REEDUCATION: CPT

## 2024-05-29 PROCEDURE — 97110 THERAPEUTIC EXERCISES: CPT

## 2024-05-29 PROCEDURE — 20560 NDL INSJ W/O NJX 1 OR 2 MUSC: CPT

## 2024-05-29 PROCEDURE — 97140 MANUAL THERAPY 1/> REGIONS: CPT

## 2024-05-29 NOTE — FLOWSHEET NOTE
[facility]- Outpatient Rehabilitation and Therapy [address] [place] [address] office: [phone] fax: [phone]  Physical Therapy Re-Certification Plan of Care        Physical Therapy: TREATMENT/PROGRESS NOTE   Patient: Ramses Castro (54 y.o. male)   Examination Date: 2024   :  1970 MRN: [id number]   Visit #: 17   Insurance Allowable Auth Needed   Med Nec []Yes    [x]No    Insurance: Payor: AETNA / Plan: AETNA / Product Type: *No Product type* /   Insurance ID: [id number] - (Commercial)  Secondary Insurance (if applicable):    Treatment Diagnosis:     ICD-10-CM    1. Neck pain  M54.2       2. Neck stiffness  M43.6          Medical Diagnosis:  Cervical spondylosis [M47.812]   Referring Physician: Tito Quezada MD  PCP: Gypsy Aviles PA-C       Plan of care signed (Y/N): Y    Date of Patient follow up with Physician: 2024     Progress Report/POC:  yes   POC update due: (10 visits /OR AUTH LIMITS, whichever is less)  2024                                             Precautions/ Contra-indications:           Latex allergy:  NO  Pacemaker:    NO  Contraindications for Manipulation: None  Date of Surgery: n/a  Other:    Red Flags:  None    C-SSRS Triggered by Intake questionnaire:   [x] No, Questionnaire did not trigger screening.   [] Yes, Patient intake triggered further evaluation      [] C-SSRS Screening completed  [] PCP notified via Plan of Care  [] Emergency services notified     Preferred Language for Healthcare:   [x] English       [] other:    SUBJECTIVE EXAMINATION     Patient stated complaint: Pt reports getting a injection tomorrow       Test used Initial score  3/11/24 2024   Pain Summary VAS 10 8/10   Functional questionnaire Neck Disability Index   16% 15/50  30%   ROM:    Cervical  Flex:25  Ext: 34  R rot:42  L rot: 45    Shoulder  R Flex 115  L Flex: 112  R abd: 92  L abd: 90      Strength:     Shoulder   Flexion

## 2024-05-30 PROBLEM — M48.02 CERVICAL SPINAL STENOSIS: Status: ACTIVE | Noted: 2024-05-30

## 2024-05-30 PROBLEM — M54.12 CERVICAL RADICULOPATHY: Status: ACTIVE | Noted: 2024-05-30

## 2024-05-30 PROBLEM — M47.812 CERVICAL SPONDYLOSIS WITHOUT MYELOPATHY: Status: ACTIVE | Noted: 2024-05-30

## 2024-06-04 ENCOUNTER — HOSPITAL ENCOUNTER (OUTPATIENT)
Dept: PHYSICAL THERAPY | Age: 54
Setting detail: THERAPIES SERIES
Discharge: HOME | End: 2024-06-04
Payer: COMMERCIAL

## 2024-06-04 PROCEDURE — 20560 NDL INSJ W/O NJX 1 OR 2 MUSC: CPT

## 2024-06-04 PROCEDURE — 97112 NEUROMUSCULAR REEDUCATION: CPT

## 2024-06-04 PROCEDURE — 97110 THERAPEUTIC EXERCISES: CPT

## 2024-06-04 PROCEDURE — 97140 MANUAL THERAPY 1/> REGIONS: CPT

## 2024-06-04 NOTE — FLOWSHEET NOTE
[facility]- Outpatient Rehabilitation and Therapy [address] [place] [address] office: [phone] fax: [phone]  Physical Therapy Re-Certification Plan of Care        Physical Therapy: TREATMENT/PROGRESS NOTE   Patient: Ramses Castro (54 y.o. male)   Examination Date: 2024   :  1970 MRN: [id number]   Visit #: 18   Insurance Allowable Auth Needed   Med Nec []Yes    [x]No    Insurance: Payor: AETNA / Plan: AETNA / Product Type: *No Product type* /   Insurance ID: [id number] - (Commercial)  Secondary Insurance (if applicable):    Treatment Diagnosis:     ICD-10-CM    1. Neck pain  M54.2       2. Neck stiffness  M43.6          Medical Diagnosis:  Cervical spondylosis [M47.812]   Referring Physician: Tito Quezada MD  PCP: Gypsy Aviles PA-C       Plan of care signed (Y/N): Y    Date of Patient follow up with Physician: 2024     Progress Report/POC:  yes   POC update due: (10 visits /OR AUTH LIMITS, whichever is less)  2024                                             Precautions/ Contra-indications:           Latex allergy:  NO  Pacemaker:    NO  Contraindications for Manipulation: None  Date of Surgery: n/a  Other:    Red Flags:  None    C-SSRS Triggered by Intake questionnaire:   [x] No, Questionnaire did not trigger screening.   [] Yes, Patient intake triggered further evaluation      [] C-SSRS Screening completed  [] PCP notified via Plan of Care  [] Emergency services notified     Preferred Language for Healthcare:   [x] English       [] other:    SUBJECTIVE EXAMINATION     Patient stated complaint: Pt reports having had a rough day at work today and was knocked over today and was dizzy and had a headache. Also was supposed to get a injection however when he was at the MD found out his insurance hadn't approved it yet. Patient reports having increased pain when he doesn't get the dry needling.        Test used Initial score  3/11/24

## 2024-06-06 ENCOUNTER — HOSPITAL ENCOUNTER (OUTPATIENT)
Dept: PHYSICAL THERAPY | Age: 54
Setting detail: THERAPIES SERIES
Discharge: HOME | End: 2024-06-06
Payer: COMMERCIAL

## 2024-06-06 PROCEDURE — 20560 NDL INSJ W/O NJX 1 OR 2 MUSC: CPT

## 2024-06-06 PROCEDURE — 97110 THERAPEUTIC EXERCISES: CPT

## 2024-06-06 PROCEDURE — 97112 NEUROMUSCULAR REEDUCATION: CPT

## 2024-06-06 PROCEDURE — 97140 MANUAL THERAPY 1/> REGIONS: CPT

## 2024-06-06 NOTE — FLOWSHEET NOTE
Goals: To be achieved in: 2 weeks  Independent in HEP and progression per patient tolerance, in order to progress toward full function and prevent re-injury.    Status: [] Progressing: [x] Met: [] Not Met: [] Adjusted  Patient will have a decrease in pain to 2/10 to help facilitate improvement in movement, function, and ADLs as indicated by functional deficits.   Status: [x] Progressing: [] Met: [] Not Met: [] Adjusted    Long Term Goals: To be achieved in:  12  weeks  Disability index score of 10% or less for the Neck Disability Index to assist with return top prior level of function.   Status: [x] Progressing: [] Met: [] Not Met: [] Adjusted  Improve shoulder AROM to WFL to allow for proper joint functioning as indicated by patients functional deficits.  Status: [x] Progressing: [] Met: [] Not Met: [] Adjusted  Pt to improve strength to 4+/5 or better of deep cervical flexors, posterior chain UE/postural muscles, rotator cuff, scapular retractors, shoulder elevators, biceps, and triceps to allow for proper muscle and joint use in functional mobility, ADLs and prior level of function   Status: [x] Progressing: [] Met: [] Not Met: [] Adjusted  Patient will return to  Throwing without increased symptoms or restriction to work towards return to prior level of function.        Status: [x] Progressing: [] Met: [] Not Met: [] Adjusted  Patient will be able to manage symptoms while resuming full duty at work.             Status: [x] Progressing: [] Met: [] Not Met: [] Adjusted    TREATMENT PLAN     Frequency/Duration: 1-2x/week for  12  weeks for the following treatment interventions:    Interventions:  [x] Therapeutic exercise including: strength training, ROM, including postural re-education.   [x] NMR activation and proprioception, including postural re-education.    [x] Manual therapy as indicated to include: PROM, Gr I-IV mobilizations, and STM  [x] Modalities as needed that may include: Cryotherapy, Electrical

## 2024-06-07 ENCOUNTER — OFFICE VISIT (OUTPATIENT)
Dept: VASCULAR SURGERY | Age: 54
End: 2024-06-07
Payer: COMMERCIAL

## 2024-06-07 VITALS
DIASTOLIC BLOOD PRESSURE: 78 MMHG | BODY MASS INDEX: 20.51 KG/M2 | HEIGHT: 71 IN | SYSTOLIC BLOOD PRESSURE: 118 MMHG | WEIGHT: 146.5 LBS

## 2024-06-07 DIAGNOSIS — I65.02 VERTEBRAL ARTERY STENOSIS, ASYMPTOMATIC, LEFT: Primary | ICD-10-CM

## 2024-06-07 PROCEDURE — 99204 OFFICE O/P NEW MOD 45 MIN: CPT | Performed by: SURGERY

## 2024-06-07 NOTE — PROGRESS NOTES
Outpatient Consultation / H&P    Date of Consultation:  6/7/2024    PCP:  Gypsy Aviles PA-C     Referring Provider:  Dr. Aviles     Chief Complaint:   Chief Complaint   Patient presents with    Other     Patient ref by MARÍA Callejas for vertebral artery stenosis.pamlr        History of Present Illness:   We are asked to see this patient in consultation by Dr. Aviles regarding vertebral artery stenosis.   Ramses Castro is a 54 y.o. male who underwent cervical MRI for evaluation Neck pain.  On that exam it was reported there was loss of normal flow void of the left Vertebral artery suggesting either stenosis and sluggish flow vs occlusion.  He was subsequently sent for Carotid/Vertebral duplex and referred here.    He has no history of CVA.  He denies any symptoms suggestive of Vertebral basilar insufficiency.       Past Medical History:  Past Medical History:   Diagnosis Date    Asthma        Past Surgical History:  Past Surgical History:   Procedure Laterality Date    FOOT FRACTURE SURGERY Right 4/30/2020    OPEN REDUCTION AND PINNING RIGHT 2ND AND 3RD TOE FRACTURES performed by Guzman Pan MD at [facility] OR    SHOULDER SURGERY      left       Home Medications:   Prior to Admission medications    Medication Sig Start Date End Date Taking? Authorizing Provider   acetaminophen (TYLENOL) 500 MG tablet Take 1 tablet by mouth every 6 hours as needed for Pain   Yes Provider, MD Jaylin        Allergies:  Peanut butter flavor and Morphine      Social History:      Social History     Socioeconomic History    Marital status:      Spouse name: Not on file    Number of children: Not on file    Years of education: Not on file    Highest education level: Not on file   Occupational History    Not on file   Tobacco Use    Smoking status: Former     Current packs/day: 0.00     Types: Cigarettes     Quit date: 3/10/2014     Years since quitting: 10.2    Smokeless tobacco: Never   Vaping Use    Vaping Use: Every day

## 2024-06-10 ENCOUNTER — HOSPITAL ENCOUNTER (OUTPATIENT)
Dept: PHYSICAL THERAPY | Age: 54
Setting detail: THERAPIES SERIES
Discharge: HOME | End: 2024-06-10
Payer: COMMERCIAL

## 2024-06-10 PROCEDURE — 20560 NDL INSJ W/O NJX 1 OR 2 MUSC: CPT

## 2024-06-10 PROCEDURE — 97112 NEUROMUSCULAR REEDUCATION: CPT

## 2024-06-10 PROCEDURE — 97140 MANUAL THERAPY 1/> REGIONS: CPT

## 2024-06-10 PROCEDURE — 97110 THERAPEUTIC EXERCISES: CPT

## 2024-06-10 NOTE — FLOWSHEET NOTE
112  R abd: 92  L abd: 90      Strength:     Shoulder   Flexion R&L: 4/5          Exercises/Interventions         Therapeutic Ex (00383)  resistance Sets/time Reps Notes/Cues/Progressions   Pulley's  5'     Supine chin tuck   5\" 15 x            Cervical rotation stretch   20\" 5 x     Supine Open book  5\" 20 x     Posterior capsule stretch   20\" 5 x     Seated PRE with scap retract (scap/flex)   20 x     UT stretch seated   20\" 3 x     Seated Levator Scap Stretch  30\" 3 x           Standing Radial nerve glides   10\" 5 x    Wrist flexion stretch   10\" 10 x     Wrist extension stretch   10\" 10 x             Prone Scap Squeeze   20 x     Prone Row   20 x            Posture Push   1  10\" 10x     Doorway stretch  30\"  1 3x    TB rows  Blue TB 1 20 x     TB Ext Blue TB 1 20 x     No moneys Blue TB 1  5\" 20 x  VC for upright posture          Manual Intervention (39928)  TIME     DTM to upper trap  5'     UT and STM passive stretch 5'     STM Extensor muscle group  4'            NMR re-education (94283) resistance Sets/time Reps CUES NEEDED   Seated Scap Squeeze  1  5\" 25 x  Seated shrugs with scap sqeeze   1  5\" 25 x      Supine Scap Squeezes  1  5\" 25 x                                     Therapeutic Activity (93298)  Sets/time                                 DN   10'         Spoke with Dr. Quezada  regarding the use of Dry Needling ; consent from patient sign 4/24/2024    Dry needling manual therapy: consisted on the placement of 2 needles in the following muscles: B Upper Trap.  A .30 x .50 mm needle was inserted, piston, rotated, and coned to produce intramuscular mobilization.  These techniques were used to restore functional range of motion, reduce muscle spasm and induce healing in the corresponding musculature. (94598)  Clean Technique was utilized today while applying Dry needling treatment.  The treatment sites where cleaned with 70% solution of  isopropyl alcohol .  The PT washed their hands and utilized treatment

## 2024-06-13 ENCOUNTER — HOSPITAL ENCOUNTER (OUTPATIENT)
Dept: PHYSICAL THERAPY | Age: 54
Setting detail: THERAPIES SERIES
Discharge: HOME | End: 2024-06-13
Payer: COMMERCIAL

## 2024-06-13 PROCEDURE — 97112 NEUROMUSCULAR REEDUCATION: CPT

## 2024-06-13 PROCEDURE — 97140 MANUAL THERAPY 1/> REGIONS: CPT

## 2024-06-13 PROCEDURE — 20560 NDL INSJ W/O NJX 1 OR 2 MUSC: CPT

## 2024-06-13 PROCEDURE — 97110 THERAPEUTIC EXERCISES: CPT

## 2024-06-13 NOTE — FLOWSHEET NOTE
Suctioned in triage, charge RN aware pt to be roomed next   .  The PT washed their hands and utilized treatment gloves along with hand  prior to inserting the needles.  All needles where removed and discarded in the appropriate sharps container.  MD has given verbal and/or written approval for this treatment.         Modalities:    Hot Pack 10 min    Education/Home Exercise Program: Patient HEP program created electronically.  Refer to [facility] [facility] access code: Access Code: [email or web address]  Access Code: [email or web address]  URL: [email or web address]  Date: 03/11/2024  Prepared by: Alexandria Gustafson    Exercises  - Supine Chin Tuck  - 1 x daily - 7 x weekly - 1 sets - 10 reps - 5\" hold  - Supine Cervical Rotation AROM on Pillow  - 1 x daily - 7 x weekly - 1 sets - 15 reps  - Supine Head Nod with Deep Neck Flexor Activation  - 1-3 x daily - 7 x weekly - 1 sets - 1 reps - 3-5' hold  - Supine Scapular Retraction  - 1 x daily - 7 x weekly - 2 sets - 10 reps  - Standing Upper Trapezius Stretch  - 1 x daily - 7 x weekly - 1 sets - 3 reps - 15\" hold  - Gentle Levator Scapulae Stretch  - 1 x daily - 7 x weekly - 1 sets - 3 reps - 15\" hold  - Seated Scapular Retraction  - 1 x daily - 7 x weekly - 3 sets - 10 reps      ASSESSMENT   Today's Assessment: Patient had fair  tolerance to today's session, reporting reduced soreness and muscular fatigue with program completed. Able to progress  exercise difficulty on [place]. Continues to display deficits in tightness and stiffness which required ongoing skilled physical therapy and decision making.           Medical Necessity Documentation:  I certify that this patient meets the below criteria necessary for medical necessity for care and/or justification of therapy services:  The patient has a musculoskeletal condition(s) with a corresponding ICD-10 code that is of complexity and severity that require skilled therapeutic intervention. This has a direct and significant impact on the need for therapy and significantly impacts the rate of recovery.

## 2024-06-17 ENCOUNTER — HOSPITAL ENCOUNTER (OUTPATIENT)
Dept: PHYSICAL THERAPY | Age: 54
Setting detail: THERAPIES SERIES
Discharge: HOME | End: 2024-06-17
Payer: COMMERCIAL

## 2024-06-17 PROCEDURE — 97110 THERAPEUTIC EXERCISES: CPT

## 2024-06-17 PROCEDURE — 97112 NEUROMUSCULAR REEDUCATION: CPT

## 2024-06-17 PROCEDURE — 97140 MANUAL THERAPY 1/> REGIONS: CPT

## 2024-06-17 NOTE — FLOWSHEET NOTE
Adjusted    Therapist goals for Patient:   Short Term Goals: To be achieved in: 2 weeks  Independent in HEP and progression per patient tolerance, in order to progress toward full function and prevent re-injury.    Status: [] Progressing: [x] Met: [] Not Met: [] Adjusted  Patient will have a decrease in pain to 2/10 to help facilitate improvement in movement, function, and ADLs as indicated by functional deficits.   Status: [x] Progressing: [] Met: [] Not Met: [] Adjusted    Long Term Goals: To be achieved in:  12  weeks  Disability index score of 10% or less for the Neck Disability Index to assist with return top prior level of function.   Status: [x] Progressing: [] Met: [] Not Met: [] Adjusted  Improve shoulder AROM to WFL to allow for proper joint functioning as indicated by patients functional deficits.  Status: [x] Progressing: [] Met: [] Not Met: [] Adjusted  Pt to improve strength to 4+/5 or better of deep cervical flexors, posterior chain UE/postural muscles, rotator cuff, scapular retractors, shoulder elevators, biceps, and triceps to allow for proper muscle and joint use in functional mobility, ADLs and prior level of function   Status: [x] Progressing: [] Met: [] Not Met: [] Adjusted  Patient will return to  Throwing without increased symptoms or restriction to work towards return to prior level of function.        Status: [x] Progressing: [] Met: [] Not Met: [] Adjusted  Patient will be able to manage symptoms while resuming full duty at work.             Status: [x] Progressing: [] Met: [] Not Met: [] Adjusted    TREATMENT PLAN     Frequency/Duration: 1-2x/week for  12  weeks for the following treatment interventions:    Interventions:  [x] Therapeutic exercise including: strength training, ROM, including postural re-education.   [x] NMR activation and proprioception, including postural re-education.    [x] Manual therapy as indicated to include: PROM, Gr I-IV mobilizations, and STM  [x] Modalities as

## 2024-06-19 ENCOUNTER — HOSPITAL ENCOUNTER (OUTPATIENT)
Dept: PHYSICAL THERAPY | Age: 54
Setting detail: THERAPIES SERIES
End: 2024-06-19
Payer: COMMERCIAL

## (undated) DEVICE — DRESSING,GAUZE,XEROFORM,CURAD,1"X8",ST: Brand: CURAD

## (undated) DEVICE — SPLINT ORTH W3XL12IN LAYERED FBRGLS FOAM PD BRTH BK MOLD

## (undated) DEVICE — PACK PROC ORTH LO EXT IX CUST

## (undated) DEVICE — GAUZE,SPONGE,4"X4",8PLY,STRL,LF,10/TRAY: Brand: MEDLINE

## (undated) DEVICE — SINGLE USE DEVICE INTENDED TO COVER EXPOSED ENDS OF ORTHOPEDIC PIN AND K-WIRES TO HELP PROTECT THE EXPOSED WIRE FROM SNAGGING ON CLOTHING.: Brand: OXBORO™ PIN COVER

## (undated) DEVICE — BANDAGE GZ W2XL75IN ST RAYON POLY CNFRM STRTCH LTWT

## (undated) DEVICE — SUTURE VCRL + SZ 2-0 L27IN ABSRB VLT SH 1/2 CIR TAPERPOINT VCP317H

## (undated) DEVICE — ZIMMER® STERILE DISPOSABLE TOURNIQUET CUFF WITH PLC, DUAL PORT, SINGLE BLADDER, 30 IN. (76 CM)

## (undated) DEVICE — GLOVE,SURG,SENSICARE SLT,LF,PF,7.5: Brand: MEDLINE

## (undated) DEVICE — SUTURE ETHLN SZ 4-0 L18IN NONABSORBABLE BLK L19MM PC-5 3/8 1894G

## (undated) DEVICE — BANDAGE COBAN 4 IN COMPR W4INXL5YD FOAM COHESIVE QUIK STK SELF ADH SFT

## (undated) DEVICE — 3M™ COBAN™ SELF-ADHERENT WRAP, 1583S, STERILE, 3 IN X 5 YD (7,5 CM X 4,5 M), 24 ROLLS/CASE: Brand: 3M™ COBAN™

## (undated) DEVICE — SUTURE VCRL + SZ 3-0 L27IN ABSRB UD L26MM SH 1/2 CIR VCP416H

## (undated) DEVICE — BANDAGE COMPR W6XL12FT SGL LAYERED NO CLSR EXSANGUATION

## (undated) DEVICE — MINOR SET UP: Brand: MEDLINE INDUSTRIES, INC.

## (undated) DEVICE — DRAPE EQUIP C ARM MINI 10000100] TIDI PRODUCTS INC]

## (undated) DEVICE — STERILE POLYISOPRENE POWDER-FREE SURGICAL GLOVES: Brand: PROTEXIS

## (undated) DEVICE — CHLORAPREP 26ML ORANGE

## (undated) DEVICE — BANDAGE ACE 4X5YDNS

## (undated) DEVICE — PADDING CAST W4INXL4YD NONSTERILE COT RAYON MICROPLEATED